# Patient Record
Sex: FEMALE | Race: WHITE | Employment: PART TIME | URBAN - METROPOLITAN AREA
[De-identification: names, ages, dates, MRNs, and addresses within clinical notes are randomized per-mention and may not be internally consistent; named-entity substitution may affect disease eponyms.]

---

## 2017-01-01 ENCOUNTER — HOSPITAL ENCOUNTER (OUTPATIENT)
Dept: OBGYN CLINIC | Age: 27
Discharge: HOME | End: 2017-01-31
Attending: OBSTETRICS & GYNECOLOGY | Admitting: OBSTETRICS & GYNECOLOGY

## 2017-01-06 ENCOUNTER — HOSPITAL ENCOUNTER (OUTPATIENT)
Dept: OBGYN CLINIC | Age: 27
Discharge: HOME | End: 2017-01-06
Admitting: OBSTETRICS & GYNECOLOGY

## 2017-01-06 VITALS
HEART RATE: 118 BPM | WEIGHT: 133 LBS | DIASTOLIC BLOOD PRESSURE: 85 MMHG | BODY MASS INDEX: 22.13 KG/M2 | SYSTOLIC BLOOD PRESSURE: 141 MMHG

## 2017-01-06 DIAGNOSIS — Z34.91 NORMAL PREGNANCY IN FIRST TRIMESTER: Primary | ICD-10-CM

## 2017-01-06 LAB
CLARITY: CLEAR
COLOR: YELLOW
GLUCOSE URINE: NEGATIVE MG/DL
PROTEIN UA: NEGATIVE MG/DL

## 2017-01-06 RX ORDER — VITAMIN A ACETATE, .BETA.-CAROTENE, ASCORBIC ACID, CHOLECALCIFEROL, .ALPHA.-TOCOPHEROL ACETATE, DL-, THIAMINE MONONITRATE, RIBOFLAVIN, NIACINAMIDE, PYRIDOXINE HYDROCHLORIDE, FOLIC ACID, CYANOCOBALAMIN, CALCIUM CARBONATE, FERROUS FUMARATE, ZINC OXIDE, AND CUPRIC OXIDE 2000; 2000; 120; 400; 22; 1.84; 3; 20; 10; 1; 12; 200; 27; 25; 2 [IU]/1; [IU]/1; MG/1; [IU]/1; MG/1; MG/1; MG/1; MG/1; MG/1; MG/1; UG/1; MG/1; MG/1; MG/1; MG/1
1 TABLET ORAL DAILY
Qty: 30 TABLET | Refills: 11 | Status: SHIPPED | OUTPATIENT
Start: 2017-01-06 | End: 2017-02-03

## 2017-01-06 RX ORDER — PNV NO.95/FERROUS FUM/FOLIC AC 28MG-0.8MG
1 TABLET ORAL DAILY
Qty: 30 TABLET | Refills: 11 | Status: SHIPPED | OUTPATIENT
Start: 2017-01-06 | End: 2017-02-03

## 2017-01-11 LAB
AFP INTERPRETATION: NORMAL
AFP MOM: 1.36
AFP SPECIMEN: NORMAL
D-INHIBIN: 155 PG/ML
DATING: NORMAL
EER MATERNAL SCREEN AFP: NORMAL
ESTIMATED DUE DATE: NORMAL
FETUS COUNT: NORMAL
GESTATIONAL AGE: 16.71 WEEKS
HISTORY OF ANEUPLOIDY?: NO
HISTORY/NEURAL TUBE DEFECTS: NO
INSULIN DEP. DIABETIC: NO
MATERNAL AGE AT EDD: 27.2 YR
MATERNAL WEIGHT: 133 LBS
MOM FOR HCG: 1.91
MOM FOR UE3: 0.97
MOM INHIBN: 0.91
PT AFP: 53 NG/ML
PT HCG: NORMAL IU/L
PT UE3: 1.07 NG/ML
RACE: NORMAL

## 2017-02-01 ENCOUNTER — HOSPITAL ENCOUNTER (OUTPATIENT)
Dept: OBGYN CLINIC | Age: 27
Discharge: HOME | End: 2017-02-28
Attending: OBSTETRICS & GYNECOLOGY | Admitting: OBSTETRICS & GYNECOLOGY

## 2017-02-03 ENCOUNTER — HOSPITAL ENCOUNTER (OUTPATIENT)
Dept: OBGYN CLINIC | Age: 27
Discharge: HOME | End: 2017-02-03
Admitting: OBSTETRICS & GYNECOLOGY

## 2017-02-03 ENCOUNTER — HOSPITAL ENCOUNTER (OUTPATIENT)
Dept: ULTRASOUND IMAGING | Age: 27
Discharge: HOME | End: 2017-02-03
Attending: ADVANCED PRACTICE MIDWIFE | Admitting: ADVANCED PRACTICE MIDWIFE

## 2017-02-03 VITALS
WEIGHT: 138 LBS | SYSTOLIC BLOOD PRESSURE: 113 MMHG | DIASTOLIC BLOOD PRESSURE: 79 MMHG | HEART RATE: 90 BPM | BODY MASS INDEX: 22.96 KG/M2

## 2017-02-03 DIAGNOSIS — Z34.91 NORMAL PREGNANCY IN FIRST TRIMESTER: ICD-10-CM

## 2017-02-03 DIAGNOSIS — Z34.00 ENCOUNTER FOR SUPERVISION OF NORMAL FIRST PREGNANCY: ICD-10-CM

## 2017-02-06 LAB
CLARITY: CLEAR
COLOR: YELLOW
GLUCOSE URINE: NEGATIVE MG/DL
PROTEIN UA: NEGATIVE MG/DL

## 2017-03-01 ENCOUNTER — HOSPITAL ENCOUNTER (OUTPATIENT)
Dept: OBGYN CLINIC | Age: 27
Discharge: HOME | End: 2017-03-31
Attending: OBSTETRICS & GYNECOLOGY | Admitting: OBSTETRICS & GYNECOLOGY

## 2017-03-03 ENCOUNTER — HOSPITAL ENCOUNTER (OUTPATIENT)
Dept: OBGYN CLINIC | Age: 27
Discharge: HOME | End: 2017-03-04
Admitting: OBSTETRICS & GYNECOLOGY

## 2017-03-03 VITALS
SYSTOLIC BLOOD PRESSURE: 135 MMHG | HEART RATE: 74 BPM | BODY MASS INDEX: 24.63 KG/M2 | WEIGHT: 148 LBS | DIASTOLIC BLOOD PRESSURE: 84 MMHG

## 2017-03-03 DIAGNOSIS — M41.20 SCOLIOSIS (AND KYPHOSCOLIOSIS), IDIOPATHIC: ICD-10-CM

## 2017-03-03 DIAGNOSIS — O26.892 RH NEGATIVE STATUS DURING PREGNANCY, SECOND TRIMESTER: ICD-10-CM

## 2017-03-03 DIAGNOSIS — Z34.91 NORMAL PREGNANCY IN FIRST TRIMESTER: ICD-10-CM

## 2017-03-03 DIAGNOSIS — Z34.00 ENCOUNTER FOR SUPERVISION OF NORMAL FIRST PREGNANCY: ICD-10-CM

## 2017-03-03 DIAGNOSIS — Z67.91 RH NEGATIVE STATUS DURING PREGNANCY, SECOND TRIMESTER: ICD-10-CM

## 2017-03-03 PROBLEM — O26.899 RH NEGATIVE STATUS DURING PREGNANCY: Status: ACTIVE | Noted: 2017-03-03

## 2017-03-03 LAB
ABO/RH: NORMAL
ANTIBODY SCREEN: NORMAL
GLUCOSE CHALLENGE: 92 MG/DL
HCT VFR BLD CALC: 37.9 % (ref 36–48)
HEMOGLOBIN: 12.3 G/DL (ref 12–16)
MCH RBC QN AUTO: 30.9 PG (ref 26–34)
MCHC RBC AUTO-ENTMCNC: 32.4 G/DL (ref 31–36)
MCV RBC AUTO: 95.2 FL (ref 80–100)
PDW BLD-RTO: 13.5 % (ref 12.4–15.4)
PLATELET # BLD: 311 K/UL (ref 135–450)
PMV BLD AUTO: 8.7 FL (ref 5–10.5)
RBC # BLD: 3.98 M/UL (ref 4–5.2)
WBC # BLD: 11.7 K/UL (ref 4–11)

## 2017-03-04 LAB — RPR: NORMAL

## 2017-03-06 LAB
CLARITY: CLEAR
COLOR: YELLOW
GLUCOSE URINE: NEGATIVE MG/DL
PROTEIN UA: NEGATIVE MG/DL

## 2017-03-31 ENCOUNTER — HOSPITAL ENCOUNTER (OUTPATIENT)
Dept: OBGYN CLINIC | Age: 27
Discharge: HOME | End: 2017-04-01
Admitting: OBSTETRICS & GYNECOLOGY

## 2017-03-31 VITALS
WEIGHT: 157 LBS | BODY MASS INDEX: 26.13 KG/M2 | DIASTOLIC BLOOD PRESSURE: 75 MMHG | HEART RATE: 85 BPM | SYSTOLIC BLOOD PRESSURE: 117 MMHG

## 2017-03-31 DIAGNOSIS — Z67.91 RH NEGATIVE STATUS DURING PREGNANCY, THIRD TRIMESTER: Primary | ICD-10-CM

## 2017-03-31 DIAGNOSIS — O26.893 RH NEGATIVE STATUS DURING PREGNANCY, THIRD TRIMESTER: Primary | ICD-10-CM

## 2017-03-31 DIAGNOSIS — Z34.91 NORMAL PREGNANCY IN FIRST TRIMESTER: ICD-10-CM

## 2017-03-31 LAB
CLARITY: CLEAR
COLOR: YELLOW
GLUCOSE URINE: NEGATIVE MG/DL
PROTEIN UA: NEGATIVE MG/DL
RHIG LOT NUMBER: NORMAL

## 2017-04-14 ENCOUNTER — HOSPITAL ENCOUNTER (OUTPATIENT)
Dept: OBGYN CLINIC | Age: 27
Discharge: HOME | End: 2017-04-15
Admitting: OBSTETRICS & GYNECOLOGY

## 2017-04-14 VITALS
DIASTOLIC BLOOD PRESSURE: 81 MMHG | SYSTOLIC BLOOD PRESSURE: 129 MMHG | BODY MASS INDEX: 26.53 KG/M2 | WEIGHT: 159.4 LBS | HEART RATE: 109 BPM

## 2017-04-14 DIAGNOSIS — Z34.91 NORMAL PREGNANCY IN FIRST TRIMESTER: ICD-10-CM

## 2017-04-14 LAB
CLARITY: CLEAR
COLOR: YELLOW
GLUCOSE URINE: NEGATIVE MG/DL
PROTEIN UA: NEGATIVE MG/DL

## 2017-04-28 ENCOUNTER — HOSPITAL ENCOUNTER (OUTPATIENT)
Dept: OBGYN CLINIC | Age: 27
Discharge: HOME | End: 2017-04-29
Admitting: OBSTETRICS & GYNECOLOGY

## 2017-04-28 VITALS
SYSTOLIC BLOOD PRESSURE: 131 MMHG | DIASTOLIC BLOOD PRESSURE: 88 MMHG | WEIGHT: 164 LBS | HEART RATE: 111 BPM | BODY MASS INDEX: 27.29 KG/M2

## 2017-04-28 LAB
CLARITY: CLEAR
COLOR: YELLOW
GLUCOSE URINE: NEGATIVE MG/DL
PROTEIN UA: NEGATIVE MG/DL

## 2017-04-28 PROCEDURE — 99213 OFFICE O/P EST LOW 20 MIN: CPT | Performed by: OBSTETRICS & GYNECOLOGY

## 2017-05-12 ENCOUNTER — HOSPITAL ENCOUNTER (OUTPATIENT)
Dept: OBGYN CLINIC | Age: 27
Discharge: HOME | End: 2017-05-13
Admitting: OBSTETRICS & GYNECOLOGY

## 2017-05-12 VITALS
BODY MASS INDEX: 28.22 KG/M2 | SYSTOLIC BLOOD PRESSURE: 136 MMHG | WEIGHT: 169.6 LBS | HEART RATE: 92 BPM | DIASTOLIC BLOOD PRESSURE: 84 MMHG

## 2017-05-12 DIAGNOSIS — M41.20 SCOLIOSIS (AND KYPHOSCOLIOSIS), IDIOPATHIC: ICD-10-CM

## 2017-05-12 LAB
CLARITY: CLEAR
COLOR: YELLOW
GLUCOSE URINE: NEGATIVE MG/DL
PROTEIN UA: NEGATIVE MG/DL

## 2017-05-12 PROCEDURE — 99213 OFFICE O/P EST LOW 20 MIN: CPT | Performed by: OBSTETRICS & GYNECOLOGY

## 2017-05-23 ENCOUNTER — HOSPITAL ENCOUNTER (OUTPATIENT)
Dept: OBGYN CLINIC | Age: 27
Discharge: HOME | End: 2017-05-24
Admitting: OBSTETRICS & GYNECOLOGY

## 2017-05-23 VITALS
SYSTOLIC BLOOD PRESSURE: 130 MMHG | BODY MASS INDEX: 28.82 KG/M2 | DIASTOLIC BLOOD PRESSURE: 82 MMHG | HEART RATE: 128 BPM | WEIGHT: 173.2 LBS

## 2017-05-23 DIAGNOSIS — Z34.91 NORMAL PREGNANCY IN FIRST TRIMESTER: ICD-10-CM

## 2017-05-23 LAB
CLARITY: CLEAR
COLOR: YELLOW
GLUCOSE URINE: NEGATIVE MG/DL
PROTEIN UA: ABNORMAL MG/DL

## 2017-05-23 PROCEDURE — 99212 OFFICE O/P EST SF 10 MIN: CPT | Performed by: OBSTETRICS & GYNECOLOGY

## 2017-05-24 LAB
C TRACH DNA GENITAL QL NAA+PROBE: NEGATIVE
N. GONORRHOEAE DNA: NEGATIVE

## 2017-05-25 LAB — GROUP B STREP CULTURE: NORMAL

## 2017-05-30 ENCOUNTER — HOSPITAL ENCOUNTER (OUTPATIENT)
Dept: OBGYN CLINIC | Age: 27
Discharge: HOME | End: 2017-05-31
Admitting: OBSTETRICS & GYNECOLOGY

## 2017-05-30 VITALS
SYSTOLIC BLOOD PRESSURE: 135 MMHG | HEART RATE: 112 BPM | DIASTOLIC BLOOD PRESSURE: 82 MMHG | BODY MASS INDEX: 28.96 KG/M2 | WEIGHT: 174 LBS

## 2017-05-30 LAB
CLARITY: CLEAR
COLOR: YELLOW
GLUCOSE URINE: NEGATIVE MG/DL
PROTEIN UA: NEGATIVE MG/DL

## 2017-05-30 PROCEDURE — 99213 OFFICE O/P EST LOW 20 MIN: CPT | Performed by: OBSTETRICS & GYNECOLOGY

## 2017-06-06 ENCOUNTER — HOSPITAL ENCOUNTER (OUTPATIENT)
Dept: OBGYN CLINIC | Age: 27
Discharge: HOME | End: 2017-06-07
Admitting: OBSTETRICS & GYNECOLOGY

## 2017-06-06 VITALS
HEART RATE: 80 BPM | WEIGHT: 177 LBS | DIASTOLIC BLOOD PRESSURE: 84 MMHG | SYSTOLIC BLOOD PRESSURE: 124 MMHG | BODY MASS INDEX: 29.45 KG/M2

## 2017-06-06 LAB
CLARITY: CLEAR
COLOR: YELLOW
GLUCOSE URINE: NEGATIVE MG/DL
PROTEIN UA: NEGATIVE MG/DL

## 2017-06-06 PROCEDURE — 99212 OFFICE O/P EST SF 10 MIN: CPT | Performed by: OBSTETRICS & GYNECOLOGY

## 2017-06-13 ENCOUNTER — HOSPITAL ENCOUNTER (OUTPATIENT)
Dept: OBGYN CLINIC | Age: 27
Discharge: HOME | End: 2017-06-14
Admitting: OBSTETRICS & GYNECOLOGY

## 2017-06-13 VITALS
HEART RATE: 90 BPM | BODY MASS INDEX: 30.05 KG/M2 | SYSTOLIC BLOOD PRESSURE: 137 MMHG | DIASTOLIC BLOOD PRESSURE: 87 MMHG | WEIGHT: 180.6 LBS

## 2017-06-13 PROCEDURE — 99212 OFFICE O/P EST SF 10 MIN: CPT | Performed by: OBSTETRICS & GYNECOLOGY

## 2017-06-14 LAB
CLARITY: NORMAL
COLOR: YELLOW
GLUCOSE URINE: NEGATIVE MG/DL
PROTEIN UA: NEGATIVE MG/DL

## 2017-06-20 ENCOUNTER — HOSPITAL ENCOUNTER (OUTPATIENT)
Dept: OBGYN CLINIC | Age: 27
Discharge: HOME | End: 2017-06-21
Admitting: OBSTETRICS & GYNECOLOGY

## 2017-06-20 VITALS
SYSTOLIC BLOOD PRESSURE: 125 MMHG | WEIGHT: 183.6 LBS | HEART RATE: 86 BPM | DIASTOLIC BLOOD PRESSURE: 80 MMHG | BODY MASS INDEX: 30.55 KG/M2

## 2017-06-20 DIAGNOSIS — Z34.91 NORMAL PREGNANCY IN FIRST TRIMESTER: Primary | ICD-10-CM

## 2017-06-20 LAB
CLARITY: CLEAR
COLOR: YELLOW
GLUCOSE URINE: NEGATIVE MG/DL
PROTEIN UA: NEGATIVE MG/DL

## 2017-06-20 PROCEDURE — 99213 OFFICE O/P EST LOW 20 MIN: CPT | Performed by: OBSTETRICS & GYNECOLOGY

## 2017-06-22 ENCOUNTER — HOSPITAL ENCOUNTER (OUTPATIENT)
Dept: LABOR AND DELIVERY | Age: 27
Discharge: HOME | End: 2017-07-12
Attending: OBSTETRICS & GYNECOLOGY | Admitting: OBSTETRICS & GYNECOLOGY

## 2017-06-25 ENCOUNTER — HOSPITAL ENCOUNTER (OUTPATIENT)
Dept: LABOR AND DELIVERY | Age: 27
Discharge: HOME | End: 2017-07-15
Attending: OBSTETRICS & GYNECOLOGY | Admitting: OBSTETRICS & GYNECOLOGY

## 2017-08-08 ENCOUNTER — HOSPITAL ENCOUNTER (OUTPATIENT)
Dept: OBGYN CLINIC | Age: 27
Discharge: HOME | End: 2017-08-09
Admitting: OBSTETRICS & GYNECOLOGY

## 2017-08-08 VITALS
BODY MASS INDEX: 25.99 KG/M2 | WEIGHT: 151.4 LBS | DIASTOLIC BLOOD PRESSURE: 81 MMHG | SYSTOLIC BLOOD PRESSURE: 115 MMHG | HEART RATE: 73 BPM

## 2017-08-08 DIAGNOSIS — Z98.891 S/P CESAREAN SECTION: ICD-10-CM

## 2019-03-15 ENCOUNTER — HOSPITAL ENCOUNTER (OUTPATIENT)
Age: 29
Discharge: HOME | End: 2019-03-15
Payer: COMMERCIAL

## 2019-03-15 ENCOUNTER — HOSPITAL ENCOUNTER (OUTPATIENT)
Dept: GENERAL RADIOLOGY | Age: 29
Discharge: HOME | End: 2019-03-15
Payer: COMMERCIAL

## 2019-03-15 ENCOUNTER — HOSPITAL ENCOUNTER (OUTPATIENT)
Dept: GENERAL RADIOLOGY | Age: 29
End: 2019-03-15
Payer: COMMERCIAL

## 2019-03-15 DIAGNOSIS — M54.2 NECK PAIN: ICD-10-CM

## 2019-03-15 PROCEDURE — 72040 X-RAY EXAM NECK SPINE 2-3 VW: CPT

## 2019-03-15 PROCEDURE — 72100 X-RAY EXAM L-S SPINE 2/3 VWS: CPT

## 2019-08-09 ENCOUNTER — HOSPITAL ENCOUNTER (OUTPATIENT)
Dept: OBGYN CLINIC | Age: 29
Discharge: HOME | End: 2019-08-09
Payer: COMMERCIAL

## 2019-08-09 VITALS
SYSTOLIC BLOOD PRESSURE: 113 MMHG | DIASTOLIC BLOOD PRESSURE: 76 MMHG | WEIGHT: 122.6 LBS | BODY MASS INDEX: 21.04 KG/M2 | HEART RATE: 91 BPM

## 2019-08-09 DIAGNOSIS — Z34.90 SECOND PREGNANCY: Primary | ICD-10-CM

## 2019-08-09 DIAGNOSIS — Z86.59 H/O POSTPARTUM DEPRESSION, CURRENTLY PREGNANT: ICD-10-CM

## 2019-08-09 DIAGNOSIS — O26.891 RH NEGATIVE STATUS DURING PREGNANCY IN FIRST TRIMESTER: ICD-10-CM

## 2019-08-09 DIAGNOSIS — O99.891 H/O POSTPARTUM DEPRESSION, CURRENTLY PREGNANT: ICD-10-CM

## 2019-08-09 DIAGNOSIS — Z67.91 RH NEGATIVE STATUS DURING PREGNANCY IN FIRST TRIMESTER: ICD-10-CM

## 2019-08-09 DIAGNOSIS — Z98.891 PREVIOUS CESAREAN SECTION: ICD-10-CM

## 2019-08-09 PROBLEM — F98.8 ADD (ATTENTION DEFICIT DISORDER): Status: ACTIVE | Noted: 2019-08-09

## 2019-08-09 LAB
ABO/RH: NORMAL
AMPHETAMINE SCREEN, URINE: NORMAL
ANTIBODY SCREEN: NORMAL
BARBITURATE SCREEN URINE: NORMAL
BASOPHILS ABSOLUTE: 0 K/UL (ref 0–0.2)
BASOPHILS RELATIVE PERCENT: 0.5 %
BENZODIAZEPINE SCREEN, URINE: NORMAL
BILIRUBIN URINE: NEGATIVE MG/DL
BLOOD, URINE: NEGATIVE
BUPRENORPHINE URINE: NORMAL
C. TRACHOMATIS, EXTERNAL RESULT: NEGATIVE
CANNABINOID SCREEN URINE: NORMAL
CLARITY: CLEAR
COCAINE METABOLITE SCREEN URINE: NORMAL
COLOR: YELLOW
EOSINOPHILS ABSOLUTE: 0.2 K/UL (ref 0–0.6)
EOSINOPHILS RELATIVE PERCENT: 2.5 %
GLUCOSE URINE: NEGATIVE MG/DL
HCT VFR BLD CALC: 39.3 % (ref 36–48)
HEMOGLOBIN: 13.2 G/DL (ref 12–16)
HEP B, EXTERNAL RESULT: NEGATIVE
HEPATITIS B SURFACE ANTIGEN INTERPRETATION: NORMAL
HIV AG/AB: NORMAL
HIV ANTIGEN: NORMAL
HIV, EXTERNAL RESULT: NEGATIVE
HIV-1 ANTIBODY: NORMAL
HIV-2 AB: NORMAL
KETONES, URINE: NEGATIVE MG/DL
LEUKOCYTE ESTERASE, URINE: NEGATIVE
LYMPHOCYTES ABSOLUTE: 2 K/UL (ref 1–5.1)
LYMPHOCYTES RELATIVE PERCENT: 26.9 %
Lab: NORMAL
MCH RBC QN AUTO: 30.5 PG (ref 26–34)
MCHC RBC AUTO-ENTMCNC: 33.7 G/DL (ref 31–36)
MCV RBC AUTO: 90.5 FL (ref 80–100)
METHADONE SCREEN, URINE: NORMAL
MONOCYTES ABSOLUTE: 0.6 K/UL (ref 0–1.3)
MONOCYTES RELATIVE PERCENT: 7.9 %
N. GONORRHOEAE, EXTERNAL RESULT: NEGATIVE
NEUTROPHILS ABSOLUTE: 4.6 K/UL (ref 1.7–7.7)
NEUTROPHILS RELATIVE PERCENT: 62.2 %
NITRITE, URINE: NEGATIVE
OPIATE SCREEN URINE: NORMAL
OXYCODONE URINE: NORMAL
PDW BLD-RTO: 12.8 % (ref 12.4–15.4)
PH UA: 5
PH UA: 6 (ref 5–8)
PHENCYCLIDINE SCREEN URINE: NORMAL
PLATELET # BLD: 310 K/UL (ref 135–450)
PMV BLD AUTO: 7.6 FL (ref 5–10.5)
PROPOXYPHENE SCREEN: NORMAL
PROTEIN UA: NEGATIVE MG/DL
RBC # BLD: 4.34 M/UL (ref 4–5.2)
RPR, EXTERNAL RESULT: NON REACTIVE
RUBELLA ANTIBODY IGG: 115.5 IU/ML
RUBELLA TITER, EXTERNAL RESULT: NORMAL
SPECIFIC GRAVITY UA: 1.01 (ref 1–1.03)
TOTAL SYPHILLIS IGG/IGM: NORMAL
UROBILINOGEN, URINE: 0.2 E.U./DL
WBC # BLD: 7.4 K/UL (ref 4–11)

## 2019-08-09 PROCEDURE — 87086 URINE CULTURE/COLONY COUNT: CPT

## 2019-08-09 PROCEDURE — 36415 COLL VENOUS BLD VENIPUNCTURE: CPT

## 2019-08-09 PROCEDURE — 85025 COMPLETE CBC W/AUTO DIFF WBC: CPT

## 2019-08-09 PROCEDURE — 87591 N.GONORRHOEAE DNA AMP PROB: CPT

## 2019-08-09 PROCEDURE — 99213 OFFICE O/P EST LOW 20 MIN: CPT

## 2019-08-09 PROCEDURE — 86850 RBC ANTIBODY SCREEN: CPT

## 2019-08-09 PROCEDURE — 87491 CHLMYD TRACH DNA AMP PROBE: CPT

## 2019-08-09 PROCEDURE — 87340 HEPATITIS B SURFACE AG IA: CPT

## 2019-08-09 PROCEDURE — 86762 RUBELLA ANTIBODY: CPT

## 2019-08-09 PROCEDURE — 86901 BLOOD TYPING SEROLOGIC RH(D): CPT

## 2019-08-09 PROCEDURE — 86702 HIV-2 ANTIBODY: CPT

## 2019-08-09 PROCEDURE — 80307 DRUG TEST PRSMV CHEM ANLYZR: CPT

## 2019-08-09 PROCEDURE — 86701 HIV-1ANTIBODY: CPT

## 2019-08-09 PROCEDURE — 86900 BLOOD TYPING SEROLOGIC ABO: CPT

## 2019-08-09 PROCEDURE — 87390 HIV-1 AG IA: CPT

## 2019-08-09 PROCEDURE — 81003 URINALYSIS AUTO W/O SCOPE: CPT

## 2019-08-09 PROCEDURE — 86780 TREPONEMA PALLIDUM: CPT

## 2019-08-09 PROCEDURE — 99203 OFFICE O/P NEW LOW 30 MIN: CPT | Performed by: OBSTETRICS & GYNECOLOGY

## 2019-08-09 PROCEDURE — 88175 CYTOPATH C/V AUTO FLUID REDO: CPT

## 2019-08-09 RX ORDER — LANOLIN ALCOHOL/MO/W.PET/CERES
25 CREAM (GRAM) TOPICAL 3 TIMES DAILY PRN
Qty: 30 TABLET | Refills: 3 | Status: SHIPPED | OUTPATIENT
Start: 2019-08-09 | End: 2019-11-27 | Stop reason: ALTCHOICE

## 2019-08-09 NOTE — PLAN OF CARE
Intermediate The teaching will focus on visit schedule and laboratory tests, plus additional topics such as health and lifestyle (e.g. kick counts, diet). This education can be found in the Discharge Instructions []   2   Complex New patient information packet given to the patient/caregiver and reviewed with the Registered Nurse. [x]   3       Patient Discharge and Planning  Planning Definition Points   General Follow-up with routine assessment and planning. Discharge instructions and After Visit Summary given to patient/caregiver and reviewed with the Registered Nurse. Simple follow-up with routine assessment and planning.    []   1   Intermediate Follow-up with routine assessment and planning. Discharge instructions and After Visit Summary given to patient/caregiver and reviewed with the Registered Nurse. Contact with additional resources (e.g. , Physician, Lactation). May include filling out forms, writing letters, communication with insurance , FLMA forms, etc.   [x]   2   Complex Full, comprehensive assessment and planning which includes assistance for a hospital admission or transfer to a higher level of care facility.   []   3     Is this the Patient's First Visit to the Prenatal Clinic    Yes     Is this Patient Established @ this [facility] AT [place]  Yes             Clinical Level of Care      Points  0-3  Level 1 []     Points  4-6  Level 2 []     Points  7-8  Level 3 [x]     Points  9-10  Level 4 []     Points  11-12  Level 5 []       Electronically signed by Ghada Mcmillan RN on 8/9/2019 at 9:44 AM

## 2019-08-09 NOTE — PROGRESS NOTES
Pt here to establish prenatal care. Intake completed per Nurse Navigator. History/meds/genetic screening questions completed. Pt pharmacy entered. Needs RX for prenatal vitamin. Denies any current complaints. New patient folder reviewed, education r/t POC, Medications/discomforts in pregnancy, when to call clinic, after hours info, delivery at [facility], etc completed at this time. Pt reports h/o anxiety/depression with management via Lexapro & Adderall prior to pregnancy discovery. D/C'd use at that time. Genetic screening ( includes patient, baby's father, or anyone in either family )   Type yes or no after each. Patient's age >32 @ EDC:  no  Thalassemia ([place], Mediterranean or  background) MCV < 80:       no   Neural Tube Defect:  no  Congenital Heart Defect:  no  Down Syndrome:  no  Tom-sachs (Ashkenazi Roman Catholic, [place], [place]):  no  Canavan Disease ([place] Herring):  no  Familial Dysautonomia ([place] Herring):  no  Sickle Cell Disease or trait (): no  Hemophilia:  no  Muscular Dystrophy:  no  Cystic Fibrosis:  no  Abby's Chorea:  no  Mental Retardation/Autism:  no  Other inherited genetic or chromosomal disorder: Patient brother born with [address], . Maternal metabolic disorder (EG. Type 1 diabetes, PKU):  no  Pt or baby's father had a child with birth defects not listed above:  no  Recurrent pregnancy loss or stillbirth:  no  Medications (vitamins, herbs, OTC drugs, illicit/recreational drugs/alcohol) since LMP  List: Lexapro/Adderall prior to pregnancy discovery.      Infection History    Live with someone with TB or exposed to TB: no  Pt or partner with history of genital herpes: no  Rash or viral illness since LMP:  no  History of MRSA:  no  Hepatitis B or C:  no  Multiple tattoos or history of IV drug use: no  History of STD (gonorrhea, chlamydia, HPV, HIV, Syphilis):no    HIGH RISK GDM (If patient meets one criteria do early 1 hour GCT)    Previous medical history

## 2019-08-09 NOTE — PROGRESS NOTES
Prenatal [facility]  [address], [address] [facility], [facility] 24      OB Follow-Up Visit Progress Note    Chief Complaint   Patient presents with    Initial Prenatal Visit        Subjective:     HISTORY OF PRESENT ILLNESS (HPI)    History of  Jerri Otoole is a 34 y.o.,  at 9w1d who presents to the clinic for her follow-up OB visit. Dara Martinez  complains of nausea. She does not complain of vaginal bleeding. She does not report leakage of fluid. She  does not complain of contractions. She  does not complain of decreased fetal movement. PAST MEDICAL HISTORY    No past medical history on file. PAST SURGICAL HISTORY    Past Surgical History:   Procedure Laterality Date    ABDOMEN SURGERY          BACK SURGERY      screws put in bilat hip    BACK SURGERY      rods placed in her back        FAMILY HISTORY    Family History   Problem Relation Age of Onset    No Known Problems Father     High Blood Pressure Mother     Hypothyroidism Mother     Other Mother     No Known Problems Brother     No Known Problems Sister        SOCIAL HISTORY    Social History     Tobacco Use    Smoking status: Never Smoker    Smokeless tobacco: Never Used   Substance Use Topics    Alcohol use: No    Drug use: No       ALLERGIES    No Known Allergies    MEDICATIONS    Current Outpatient Medications on File Prior to Encounter   Medication Sig Dispense Refill    Prenatal Vit-Fe Fumarate-FA (PRENATAL PO) Take 1 tablet by mouth daily       No current facility-administered medications on file prior to encounter. REVIEW OF SYSTEMS    Pertinent items are noted in HPI.     Objective:      /76   Pulse 91   Wt 122 lb 9.6 oz (55.6 kg)   LMP 2019 (Approximate)   BMI 21.04 kg/m²     Wt Readings from Last 2 Encounters:   19 122 lb 9.6 oz (55.6 kg)   17 151 lb 6.4 oz (68.7 kg)       See ACOG flowsheet above    Physical Exam:  A

## 2019-08-10 LAB — URINE CULTURE, ROUTINE: NORMAL

## 2019-08-12 LAB
C TRACH DNA GENITAL QL NAA+PROBE: NEGATIVE
N. GONORRHOEAE DNA: NEGATIVE

## 2019-09-06 ENCOUNTER — HOSPITAL ENCOUNTER (OUTPATIENT)
Dept: OBGYN CLINIC | Age: 29
End: 2019-09-06
Payer: COMMERCIAL

## 2019-09-06 ENCOUNTER — HOSPITAL ENCOUNTER (OUTPATIENT)
Dept: OBGYN CLINIC | Age: 29
Discharge: HOME | End: 2019-09-06
Payer: COMMERCIAL

## 2019-09-06 VITALS
BODY MASS INDEX: 21.7 KG/M2 | WEIGHT: 126.4 LBS | HEART RATE: 81 BPM | SYSTOLIC BLOOD PRESSURE: 121 MMHG | DIASTOLIC BLOOD PRESSURE: 78 MMHG

## 2019-09-06 DIAGNOSIS — Z34.90 SECOND PREGNANCY: ICD-10-CM

## 2019-09-06 LAB
BILIRUBIN URINE: NEGATIVE MG/DL
BLOOD, URINE: NEGATIVE
CLARITY: CLEAR
COLOR: YELLOW
GLUCOSE URINE: NEGATIVE MG/DL
KETONES, URINE: NEGATIVE MG/DL
LEUKOCYTE ESTERASE, URINE: NEGATIVE
NITRITE, URINE: NEGATIVE
PH UA: 6 (ref 5–8)
PROTEIN UA: NEGATIVE MG/DL
SPECIFIC GRAVITY UA: 1.02 (ref 1–1.03)
UROBILINOGEN, URINE: 0.2 E.U./DL

## 2019-09-06 PROCEDURE — 81003 URINALYSIS AUTO W/O SCOPE: CPT

## 2019-09-06 PROCEDURE — 99213 OFFICE O/P EST LOW 20 MIN: CPT | Performed by: OBSTETRICS & GYNECOLOGY

## 2019-09-06 PROCEDURE — 99211 OFF/OP EST MAY X REQ PHY/QHP: CPT

## 2019-09-06 NOTE — PROGRESS NOTES
Pt here for prenatal visit. C/o tiredness and had a dizzy spell on Tuesday and had to stay home from work. Not currently dizzy. C/o nausea, but no vomiting.
Prenatal Vit-Fe Fumarate-FA (PRENATAL PO) Take 1 tablet by mouth daily   Yes Historical Provider, MD   vitamin B-6 (PYRIDOXINE) 50 MG tablet Take 0.5 tablets by mouth 3 times daily as needed (nausea, vomiting) 19   Onesimo Pagan MD   doxyLAMINE succinate (GNP SLEEP AID) 25 MG tablet Take 1 tablet by mouth nightly as needed for Sleep (nausea, vomiting) 19   Onesimo Pagan MD       REVIEW OF SYSTEMS    Pertinent items are noted in HPI. Objective:      /78   Pulse 81   Wt 126 lb 6.4 oz (57.3 kg)   LMP 2019 (Approximate)   BMI 21.70 kg/m²     Wt Readings from Last 2 Encounters:   19 126 lb 6.4 oz (57.3 kg)   19 122 lb 9.6 oz (55.6 kg)       See ACOG flowsheet above    Physical Exam:  A physical exam was not performed during this visit. Neurologic/Psychiatric: alert and oriented X3  Constitutional: alert and oriented to person, place and time, well-developed and well-nourished, in no acute distress  Gastrointestinal: Abdomen  is soft and  is non tender and gravid  Uterus:  Non tender. Pelvic Exam:  A pelvic exam was not completed at this visit. Assessment:     Patient Active Problem List   Diagnosis Code    Scoliosis (and kyphoscoliosis), idiopathic M41.20    Rh negative status during pregnancy O26.899, Z67.91    Previous  section Z98.891    34yo  Z34.90    ADD (attention deficit disorder) F98.8    H/O postpartum depression, currently pregnant O80.80, Z80.58       34 y.o. [id number] 13w1d    Obstetrician: Rica Berumen MD      Plan:     - Prenatal labs: Reviewed by the physician  - declines aneuploidy screening  - reviewed Rhogam for Rh neg  - discussed RLTCS, timing, scheduling  - Patient to follow up in: 4 weeks    Kick count N/A. Pregnancy expectations were discussed and all questions were answered. 75% of time was spent discussing findings, management, and treatment options.             Electronically signed by Rica Berumen MD

## 2019-09-06 NOTE — PLAN OF CARE
Prenatal Clinic  Clinical Level of [address]    NAME: Jennifer Bermeo  YOB: 1990 GENDER: female  MEDICAL RECORD NUMBER:  [id number]  DATE:  9/6/2019    Assessment   Points   No Assessment []   0   General Prenatal assessment (e.g. weight, vital signs, urine test). Completed OB Clinic navigator tabs, in partnership with the Registered Nurse. [x]   1   General Prenatal assessment (e.g. weight, vital signs, urine test). Completed OB Clinic navigator tabs, in partnership with the Registered Nurse. Set up tests or procedures (e.g. ultrasound, specimen swabs, induction). []   2   Full Prenatal assessment (e.g. weight, vitals signs, urine test) to include a full review of history. Completed OB Clinic navigator tabs, in partnership with the Registered Nurse. Set up tests or procedures (e.g. ultrasound, specimen swabs, induction). Or transfer to an outside facility, transfer to [facility] for further evaluation, admission to the hospital.  []   3       Ambulation Status   Status Definition Points   Independent Independently able to ambulate. Fully able (without any assistance) to get on/off exam table/chair. [x]   0   Minimal Physical Assistance Requires physical assistance of one person to ambulate and/or position patient to be examined. Includes necessary physical assistance to position lower extremities on/off stool. []   1   Moderate Physical Assistance Requires at least one staff member to physically assist patient in ambulating into treatment room, and on/off recliner chair. []   2   Full Assistance Requires assistance of at least two staff members to transfer patient into treatment room and/or on/off exam table/chair. \"Total Transfer\". []   3       Teaching Effort   Effort Definition Points   No Teaching  []   0   General The teaching will focus on visit schedule and laboratory tests. This education can be found in the Discharge Instructions.  [x]   1 Intermediate The teaching will focus on visit schedule and laboratory tests, plus additional topics such as health and lifestyle (e.g. kick counts, diet). This education can be found in the Discharge Instructions []   2   Complex New patient information packet given to the patient/caregiver and reviewed with the Registered Nurse.   []   3       Patient Discharge and Planning  Planning Definition Points   General Follow-up with routine assessment and planning. Discharge instructions and After Visit Summary given to patient/caregiver and reviewed with the Registered Nurse. Simple follow-up with routine assessment and planning. [x]   1   Intermediate Follow-up with routine assessment and planning. Discharge instructions and After Visit Summary given to patient/caregiver and reviewed with the Registered Nurse. Contact with additional resources (e.g. , Physician, Lactation). May include filling out forms, writing letters, communication with insurance , FLMA forms, etc.   []   2   Complex Full, comprehensive assessment and planning which includes assistance for a hospital admission or transfer to a higher level of care facility.   []   3     Is this the Patient's First Visit to the Prenatal Clinic    No     Is this Patient Established @ this [facility] AT [place]  Yes             Clinical Level of Care      Points  0-3  Level 1 [x]     Points  4-6  Level 2 []     Points  7-8  Level 3 []     Points  9-10  Level 4 []     Points  11-12  Level 5 []       Electronically signed by Ever Ruiz RN on 9/6/2019 at 9:19 AM

## 2019-10-04 ENCOUNTER — HOSPITAL ENCOUNTER (OUTPATIENT)
Dept: OBGYN CLINIC | Age: 29
Discharge: HOME | End: 2019-10-04
Payer: COMMERCIAL

## 2019-10-04 ENCOUNTER — HOSPITAL ENCOUNTER (OUTPATIENT)
Dept: OBGYN CLINIC | Age: 29
End: 2019-10-04
Payer: COMMERCIAL

## 2019-10-04 VITALS
DIASTOLIC BLOOD PRESSURE: 70 MMHG | WEIGHT: 131.6 LBS | HEART RATE: 81 BPM | SYSTOLIC BLOOD PRESSURE: 109 MMHG | BODY MASS INDEX: 22.59 KG/M2

## 2019-10-04 DIAGNOSIS — Z98.891 PREVIOUS CESAREAN SECTION: ICD-10-CM

## 2019-10-04 DIAGNOSIS — Z34.90 SECOND PREGNANCY: Primary | ICD-10-CM

## 2019-10-04 LAB
BILIRUBIN URINE: NEGATIVE MG/DL
BLOOD, URINE: NEGATIVE
CLARITY: CLEAR
COLOR: YELLOW
GLUCOSE URINE: NEGATIVE MG/DL
KETONES, URINE: NEGATIVE MG/DL
LEUKOCYTE ESTERASE, URINE: NEGATIVE
NITRITE, URINE: NEGATIVE
PH UA: 5.5 (ref 5–8)
PROTEIN UA: NEGATIVE MG/DL
SPECIFIC GRAVITY UA: 1.01 (ref 1–1.03)
UROBILINOGEN, URINE: 0.2 E.U./DL

## 2019-10-04 PROCEDURE — 99212 OFFICE O/P EST SF 10 MIN: CPT | Performed by: OBSTETRICS & GYNECOLOGY

## 2019-10-04 PROCEDURE — 99211 OFF/OP EST MAY X REQ PHY/QHP: CPT

## 2019-10-04 PROCEDURE — 81003 URINALYSIS AUTO W/O SCOPE: CPT

## 2019-11-01 ENCOUNTER — HOSPITAL ENCOUNTER (OUTPATIENT)
Dept: ULTRASOUND IMAGING | Age: 29
Discharge: HOME | End: 2019-11-01
Payer: COMMERCIAL

## 2019-11-01 ENCOUNTER — HOSPITAL ENCOUNTER (OUTPATIENT)
Dept: OBGYN CLINIC | Age: 29
Discharge: HOME | End: 2019-11-01
Payer: COMMERCIAL

## 2019-11-01 VITALS
HEART RATE: 99 BPM | BODY MASS INDEX: 23.09 KG/M2 | DIASTOLIC BLOOD PRESSURE: 69 MMHG | WEIGHT: 134.5 LBS | SYSTOLIC BLOOD PRESSURE: 101 MMHG

## 2019-11-01 DIAGNOSIS — Z34.90 SECOND PREGNANCY: ICD-10-CM

## 2019-11-01 DIAGNOSIS — Z98.891 PREVIOUS CESAREAN SECTION: Primary | ICD-10-CM

## 2019-11-01 LAB
BILIRUBIN URINE: NEGATIVE MG/DL
BLOOD, URINE: NEGATIVE
CLARITY: CLEAR
COLOR: YELLOW
GLUCOSE URINE: NEGATIVE MG/DL
KETONES, URINE: NEGATIVE MG/DL
LEUKOCYTE ESTERASE, URINE: NEGATIVE
NITRITE, URINE: NEGATIVE
PH UA: 5.5 (ref 5–8)
PROTEIN UA: NEGATIVE MG/DL
SPECIFIC GRAVITY UA: 1.02 (ref 1–1.03)
UROBILINOGEN, URINE: 0.2 E.U./DL

## 2019-11-01 PROCEDURE — 99212 OFFICE O/P EST SF 10 MIN: CPT | Performed by: OBSTETRICS & GYNECOLOGY

## 2019-11-01 PROCEDURE — 81003 URINALYSIS AUTO W/O SCOPE: CPT

## 2019-11-01 PROCEDURE — 99212 OFFICE O/P EST SF 10 MIN: CPT

## 2019-11-01 PROCEDURE — 76805 OB US >/= 14 WKS SNGL FETUS: CPT

## 2019-11-27 ENCOUNTER — HOSPITAL ENCOUNTER (OUTPATIENT)
Dept: OBGYN CLINIC | Age: 29
Discharge: HOME | End: 2019-11-27
Payer: COMMERCIAL

## 2019-11-27 VITALS
WEIGHT: 144.4 LBS | SYSTOLIC BLOOD PRESSURE: 115 MMHG | HEART RATE: 79 BPM | BODY MASS INDEX: 24.79 KG/M2 | DIASTOLIC BLOOD PRESSURE: 83 MMHG

## 2019-11-27 DIAGNOSIS — Z34.90 SECOND PREGNANCY: Primary | ICD-10-CM

## 2019-11-27 DIAGNOSIS — Z98.891 PREVIOUS CESAREAN SECTION: ICD-10-CM

## 2019-11-27 LAB
ABO/RH: NORMAL
ANTIBODY SCREEN: NORMAL
BILIRUBIN URINE: NEGATIVE MG/DL
BLOOD, URINE: NEGATIVE
CLARITY: CLEAR
COLOR: YELLOW
GLUCOSE CHALLENGE: 116 MG/DL
GLUCOSE URINE: NEGATIVE MG/DL
HCT VFR BLD CALC: 36.1 % (ref 36–48)
HEMOGLOBIN: 12.3 G/DL (ref 12–16)
KETONES, URINE: NEGATIVE MG/DL
LEUKOCYTE ESTERASE, URINE: NEGATIVE
MCH RBC QN AUTO: 32 PG (ref 26–34)
MCHC RBC AUTO-ENTMCNC: 34.2 G/DL (ref 31–36)
MCV RBC AUTO: 93.4 FL (ref 80–100)
NITRITE, URINE: NEGATIVE
PDW BLD-RTO: 13.5 % (ref 12.4–15.4)
PH UA: 6.5 (ref 5–8)
PLATELET # BLD: 243 K/UL (ref 135–450)
PMV BLD AUTO: 8.4 FL (ref 5–10.5)
PROTEIN UA: NEGATIVE MG/DL
RBC # BLD: 3.86 M/UL (ref 4–5.2)
SPECIFIC GRAVITY UA: 1.02 (ref 1–1.03)
TOTAL SYPHILLIS IGG/IGM: NORMAL
UROBILINOGEN, URINE: 0.2 E.U./DL
WBC # BLD: 9 K/UL (ref 4–11)

## 2019-11-27 PROCEDURE — 99212 OFFICE O/P EST SF 10 MIN: CPT

## 2019-11-27 PROCEDURE — 90686 IIV4 VACC NO PRSV 0.5 ML IM: CPT | Performed by: OBSTETRICS & GYNECOLOGY

## 2019-11-27 PROCEDURE — G0008 ADMIN INFLUENZA VIRUS VAC: HCPCS | Performed by: OBSTETRICS & GYNECOLOGY

## 2019-11-27 PROCEDURE — 85027 COMPLETE CBC AUTOMATED: CPT

## 2019-11-27 PROCEDURE — 99213 OFFICE O/P EST LOW 20 MIN: CPT | Performed by: OBSTETRICS & GYNECOLOGY

## 2019-11-27 PROCEDURE — 86850 RBC ANTIBODY SCREEN: CPT

## 2019-11-27 PROCEDURE — 36415 COLL VENOUS BLD VENIPUNCTURE: CPT

## 2019-11-27 PROCEDURE — 6360000002 HC RX W HCPCS: Performed by: OBSTETRICS & GYNECOLOGY

## 2019-11-27 PROCEDURE — 86900 BLOOD TYPING SEROLOGIC ABO: CPT

## 2019-11-27 PROCEDURE — 86780 TREPONEMA PALLIDUM: CPT

## 2019-11-27 PROCEDURE — 86901 BLOOD TYPING SEROLOGIC RH(D): CPT

## 2019-11-27 PROCEDURE — 82950 GLUCOSE TEST: CPT

## 2019-11-27 PROCEDURE — 81003 URINALYSIS AUTO W/O SCOPE: CPT

## 2019-11-27 RX ADMIN — INFLUENZA A VIRUS A/BRISBANE/02/2018 IVR-190 (H1N1) ANTIGEN (PROPIOLACTONE INACTIVATED), INFLUENZA A VIRUS A/KANSAS/14/2017 X-327 (H3N2) ANTIGEN (PROPIOLACTONE INACTIVATED), INFLUENZA B VIRUS B/MARYLAND/15/2016 ANTIGEN (PROPIOLACTONE INACTIVATED), INFLUENZA B VIRUS B/PHUKET/3073/2013 BVR-1B ANTIGEN (PROPIOLACTONE INACTIVATED) 0.5 ML: 15; 15; 15; 15 INJECTION, SUSPENSION INTRAMUSCULAR at 09:05

## 2019-12-20 DIAGNOSIS — O26.899 RH NEGATIVE STATE IN ANTEPARTUM PERIOD: Primary | ICD-10-CM

## 2019-12-20 DIAGNOSIS — Z67.91 RH NEGATIVE STATE IN ANTEPARTUM PERIOD: Primary | ICD-10-CM

## 2019-12-27 ENCOUNTER — HOSPITAL ENCOUNTER (OUTPATIENT)
Dept: INFUSION THERAPY | Age: 29
Setting detail: INFUSION SERIES
Discharge: HOME | End: 2019-12-27
Payer: COMMERCIAL

## 2019-12-27 ENCOUNTER — HOSPITAL ENCOUNTER (OUTPATIENT)
Dept: OBGYN CLINIC | Age: 29
Discharge: HOME | End: 2019-12-27
Payer: COMMERCIAL

## 2019-12-27 VITALS
WEIGHT: 150 LBS | SYSTOLIC BLOOD PRESSURE: 123 MMHG | DIASTOLIC BLOOD PRESSURE: 78 MMHG | HEART RATE: 87 BPM | BODY MASS INDEX: 25.75 KG/M2

## 2019-12-27 VITALS
OXYGEN SATURATION: 98 % | TEMPERATURE: 98.1 F | DIASTOLIC BLOOD PRESSURE: 76 MMHG | SYSTOLIC BLOOD PRESSURE: 117 MMHG | RESPIRATION RATE: 18 BRPM | HEART RATE: 76 BPM

## 2019-12-27 LAB
BILIRUBIN URINE: NEGATIVE MG/DL
BLOOD, URINE: NEGATIVE
CLARITY: NORMAL
COLOR: YELLOW
GLUCOSE URINE: NEGATIVE MG/DL
KETONES, URINE: NEGATIVE MG/DL
LEUKOCYTE ESTERASE, URINE: NEGATIVE
NITRITE, URINE: NEGATIVE
PH UA: 7 (ref 5–8)
PROTEIN UA: NEGATIVE MG/DL
RHIG LOT NUMBER: NORMAL
SPECIFIC GRAVITY UA: 1.01 (ref 1–1.03)
UROBILINOGEN, URINE: 0.2 E.U./DL

## 2019-12-27 PROCEDURE — 81003 URINALYSIS AUTO W/O SCOPE: CPT

## 2019-12-27 PROCEDURE — 99211 OFF/OP EST MAY X REQ PHY/QHP: CPT

## 2019-12-27 PROCEDURE — 6360000002 HC RX W HCPCS: Performed by: OBSTETRICS & GYNECOLOGY

## 2019-12-27 PROCEDURE — 99212 OFFICE O/P EST SF 10 MIN: CPT | Performed by: OBSTETRICS & GYNECOLOGY

## 2019-12-27 PROCEDURE — 96372 THER/PROPH/DIAG INJ SC/IM: CPT

## 2019-12-27 RX ADMIN — HUMAN RHO(D) IMMUNE GLOBULIN 300 MCG: 300 INJECTION, SOLUTION INTRAMUSCULAR at 11:30

## 2019-12-27 NOTE — PROGRESS NOTES
Outpatient [id number] [facility]    Rhogam Injection Visit    NAME:  Marcos Doty  YOB: 1990  MEDICAL RECORD NUMBER:  [id number]  DATE:  2019    Patient [facility] to [facility]   [] per wheelchair   [x] ambulatory    Patient just saw Dr. Orelia Goldberg in [facility] clinic. Needs Rhogam injection today     Patient Active Problem List   Diagnosis    Scoliosis (and kyphoscoliosis), idiopathic    Rh negative status during pregnancy    Previous  section    32yo     ADD (attention deficit disorder)    H/O postpartum depression, currently pregnant       Does patient have prior history of allergy to human immune globulin:  Does patient have history of  IgA deficiency:      ( # of pregnancies ) 2    Parity ( # of births )  1    Gestational age:  34 Weeks    Patient is Rh negative:  Yes    [de-identified] Rh type is:    [] Positive   [x] Unknown     Indication for Rhogam use:    [x] Antepartum prophylaxis, 26 - 28 week prophylaxis  [] Transfusion of incompatable blood    [] Other    Blood type and Antibody screening done:  Yes     Pulse 76   Temp 98.1 °F (36.7 °C) (Oral)   LMP 2019 (Approximate)   SpO2 98%       Pulse Oximetry: 98 %    Patient given instructions of possible adverse reactions:  [x] Written   [x] Oral  Locally:injection site swelling, induration, redness and mild pain at injection site, and possibly low grade temperature. Hypersensitivity reactions:  Hives, itching, chest tightness, wheezing, hypotension, wheezing and anaphylaxsis    Rho(D) Immune Globulin 300 ug  administered IM in LEFT DELTOID  Lot No: [id number]        Expiration date: 2021    Response to treatment:  Well tolerated by patient. Patient to follow up with [facility].       Electronically signed by Melly Lamb RN on 2019 at 11:39 AM.

## 2020-02-07 LAB — GBS, EXTERNAL RESULT: NEGATIVE

## 2020-02-25 ENCOUNTER — ANESTHESIA EVENT (OUTPATIENT)
Dept: LABOR AND DELIVERY | Age: 30
DRG: 540 | End: 2020-02-25
Payer: COMMERCIAL

## 2020-02-25 ENCOUNTER — HOSPITAL ENCOUNTER (INPATIENT)
Age: 30
LOS: 2 days | Discharge: HOME | DRG: 540 | End: 2020-02-27
Attending: OBSTETRICS & GYNECOLOGY | Admitting: OBSTETRICS & GYNECOLOGY
Payer: COMMERCIAL

## 2020-02-25 ENCOUNTER — ANESTHESIA (OUTPATIENT)
Dept: LABOR AND DELIVERY | Age: 30
DRG: 540 | End: 2020-02-25
Payer: COMMERCIAL

## 2020-02-25 VITALS — SYSTOLIC BLOOD PRESSURE: 111 MMHG | OXYGEN SATURATION: 100 % | DIASTOLIC BLOOD PRESSURE: 58 MMHG

## 2020-02-25 PROBLEM — Z98.891 HISTORY OF C-SECTION: Status: ACTIVE | Noted: 2020-02-25

## 2020-02-25 LAB
ABO/RH: NORMAL
AMPHETAMINE SCREEN, URINE: NORMAL
ANTIBODY IDENTIFICATION: NORMAL
ANTIBODY SCREEN: NORMAL
BARBITURATE SCREEN URINE: NORMAL
BENZODIAZEPINE SCREEN, URINE: NORMAL
BUPRENORPHINE URINE: NORMAL
CANNABINOID SCREEN URINE: NORMAL
COCAINE METABOLITE SCREEN URINE: NORMAL
DAT IGG CAPTURE: NORMAL
HCT VFR BLD CALC: 38.1 % (ref 36–48)
HEMOGLOBIN: 12.7 G/DL (ref 12–16)
Lab: NORMAL
MCH RBC QN AUTO: 30.7 PG (ref 26–34)
MCHC RBC AUTO-ENTMCNC: 33.4 G/DL (ref 31–36)
MCV RBC AUTO: 91.9 FL (ref 80–100)
METHADONE SCREEN, URINE: NORMAL
OPIATE SCREEN URINE: NORMAL
OXYCODONE URINE: NORMAL
PDW BLD-RTO: 13.1 % (ref 12.4–15.4)
PH UA: 6
PHENCYCLIDINE SCREEN URINE: NORMAL
PLATELET # BLD: 247 K/UL (ref 135–450)
PMV BLD AUTO: 8.9 FL (ref 5–10.5)
PROPOXYPHENE SCREEN: NORMAL
RBC # BLD: 4.15 M/UL (ref 4–5.2)
TOTAL SYPHILLIS IGG/IGM: NORMAL
WBC # BLD: 10.8 K/UL (ref 4–11)

## 2020-02-25 PROCEDURE — 7100000000 HC PACU RECOVERY - FIRST 15 MIN: Performed by: OBSTETRICS & GYNECOLOGY

## 2020-02-25 PROCEDURE — 80307 DRUG TEST PRSMV CHEM ANLYZR: CPT

## 2020-02-25 PROCEDURE — 86900 BLOOD TYPING SEROLOGIC ABO: CPT

## 2020-02-25 PROCEDURE — 3609079900 HC CESAREAN SECTION: Performed by: OBSTETRICS & GYNECOLOGY

## 2020-02-25 PROCEDURE — 2500000003 HC RX 250 WO HCPCS: Performed by: OBSTETRICS & GYNECOLOGY

## 2020-02-25 PROCEDURE — 86780 TREPONEMA PALLIDUM: CPT

## 2020-02-25 PROCEDURE — 6360000002 HC RX W HCPCS: Performed by: NURSE ANESTHETIST, CERTIFIED REGISTERED

## 2020-02-25 PROCEDURE — 6360000002 HC RX W HCPCS: Performed by: OBSTETRICS & GYNECOLOGY

## 2020-02-25 PROCEDURE — 86850 RBC ANTIBODY SCREEN: CPT

## 2020-02-25 PROCEDURE — 6360000002 HC RX W HCPCS: Performed by: ANESTHESIOLOGY

## 2020-02-25 PROCEDURE — 85027 COMPLETE CBC AUTOMATED: CPT

## 2020-02-25 PROCEDURE — 7100000001 HC PACU RECOVERY - ADDTL 15 MIN: Performed by: OBSTETRICS & GYNECOLOGY

## 2020-02-25 PROCEDURE — 86922 COMPATIBILITY TEST ANTIGLOB: CPT

## 2020-02-25 PROCEDURE — 86880 COOMBS TEST DIRECT: CPT

## 2020-02-25 PROCEDURE — 86870 RBC ANTIBODY IDENTIFICATION: CPT

## 2020-02-25 PROCEDURE — 6370000000 HC RX 637 (ALT 250 FOR IP): Performed by: OBSTETRICS & GYNECOLOGY

## 2020-02-25 PROCEDURE — 2709999900 HC NON-CHARGEABLE SUPPLY: Performed by: OBSTETRICS & GYNECOLOGY

## 2020-02-25 PROCEDURE — 3700000001 HC ADD 15 MINUTES (ANESTHESIA): Performed by: OBSTETRICS & GYNECOLOGY

## 2020-02-25 PROCEDURE — 2580000003 HC RX 258: Performed by: OBSTETRICS & GYNECOLOGY

## 2020-02-25 PROCEDURE — 86901 BLOOD TYPING SEROLOGIC RH(D): CPT

## 2020-02-25 PROCEDURE — 2580000003 HC RX 258: Performed by: NURSE ANESTHETIST, CERTIFIED REGISTERED

## 2020-02-25 PROCEDURE — 3700000000 HC ANESTHESIA ATTENDED CARE: Performed by: OBSTETRICS & GYNECOLOGY

## 2020-02-25 PROCEDURE — 2500000003 HC RX 250 WO HCPCS: Performed by: NURSE ANESTHETIST, CERTIFIED REGISTERED

## 2020-02-25 PROCEDURE — 1220000000 HC SEMI PRIVATE OB R&B

## 2020-02-25 RX ORDER — SIMETHICONE 80 MG
80 TABLET,CHEWABLE ORAL EVERY 6 HOURS PRN
Status: DISCONTINUED | OUTPATIENT
Start: 2020-02-25 | End: 2020-02-27 | Stop reason: HOSPADM

## 2020-02-25 RX ORDER — IBUPROFEN 400 MG/1
800 TABLET ORAL EVERY 8 HOURS PRN
Status: DISCONTINUED | OUTPATIENT
Start: 2020-02-26 | End: 2020-02-27 | Stop reason: HOSPADM

## 2020-02-25 RX ORDER — FENTANYL CITRATE 50 UG/ML
INJECTION, SOLUTION INTRAMUSCULAR; INTRAVENOUS PRN
Status: DISCONTINUED | OUTPATIENT
Start: 2020-02-25 | End: 2020-02-25 | Stop reason: SDUPTHER

## 2020-02-25 RX ORDER — MORPHINE SULFATE 1 MG/ML
INJECTION, SOLUTION EPIDURAL; INTRATHECAL; INTRAVENOUS PRN
Status: DISCONTINUED | OUTPATIENT
Start: 2020-02-25 | End: 2020-02-25

## 2020-02-25 RX ORDER — ONDANSETRON 2 MG/ML
4 INJECTION INTRAMUSCULAR; INTRAVENOUS EVERY 6 HOURS PRN
Status: DISCONTINUED | OUTPATIENT
Start: 2020-02-25 | End: 2020-02-27 | Stop reason: HOSPADM

## 2020-02-25 RX ORDER — KETOROLAC TROMETHAMINE 30 MG/ML
30 INJECTION, SOLUTION INTRAMUSCULAR; INTRAVENOUS EVERY 6 HOURS
Status: COMPLETED | OUTPATIENT
Start: 2020-02-25 | End: 2020-02-26

## 2020-02-25 RX ORDER — DIPHENHYDRAMINE HYDROCHLORIDE 50 MG/ML
25 INJECTION INTRAMUSCULAR; INTRAVENOUS EVERY 6 HOURS PRN
Status: DISCONTINUED | OUTPATIENT
Start: 2020-02-25 | End: 2020-02-27 | Stop reason: HOSPADM

## 2020-02-25 RX ORDER — DOCUSATE SODIUM 100 MG/1
100 CAPSULE, LIQUID FILLED ORAL 2 TIMES DAILY
Status: DISCONTINUED | OUTPATIENT
Start: 2020-02-25 | End: 2020-02-27 | Stop reason: HOSPADM

## 2020-02-25 RX ORDER — SODIUM CHLORIDE 0.9 % (FLUSH) 0.9 %
10 SYRINGE (ML) INJECTION EVERY 12 HOURS SCHEDULED
Status: DISCONTINUED | OUTPATIENT
Start: 2020-02-25 | End: 2020-02-27 | Stop reason: HOSPADM

## 2020-02-25 RX ORDER — NALBUPHINE HCL 10 MG/ML
5 AMPUL (ML) INJECTION EVERY 4 HOURS PRN
Status: DISCONTINUED | OUTPATIENT
Start: 2020-02-25 | End: 2020-02-27 | Stop reason: HOSPADM

## 2020-02-25 RX ORDER — KETOROLAC TROMETHAMINE 30 MG/ML
INJECTION, SOLUTION INTRAMUSCULAR; INTRAVENOUS PRN
Status: DISCONTINUED | OUTPATIENT
Start: 2020-02-25 | End: 2020-02-25 | Stop reason: SDUPTHER

## 2020-02-25 RX ORDER — ACETAMINOPHEN 650 MG/1
650 SUPPOSITORY RECTAL EVERY 4 HOURS PRN
Status: ACTIVE | OUTPATIENT
Start: 2020-02-25 | End: 2020-02-26

## 2020-02-25 RX ORDER — OXYCODONE HYDROCHLORIDE AND ACETAMINOPHEN 5; 325 MG/1; MG/1
2 TABLET ORAL EVERY 4 HOURS PRN
Status: DISCONTINUED | OUTPATIENT
Start: 2020-02-25 | End: 2020-02-27 | Stop reason: HOSPADM

## 2020-02-25 RX ORDER — SODIUM CHLORIDE, SODIUM LACTATE, POTASSIUM CHLORIDE, CALCIUM CHLORIDE 600; 310; 30; 20 MG/100ML; MG/100ML; MG/100ML; MG/100ML
INJECTION, SOLUTION INTRAVENOUS CONTINUOUS
Status: DISCONTINUED | OUTPATIENT
Start: 2020-02-25 | End: 2020-02-27 | Stop reason: HOSPADM

## 2020-02-25 RX ORDER — OXYTOCIN 10 [USP'U]/ML
INJECTION, SOLUTION INTRAMUSCULAR; INTRAVENOUS PRN
Status: DISCONTINUED | OUTPATIENT
Start: 2020-02-25 | End: 2020-02-25 | Stop reason: SDUPTHER

## 2020-02-25 RX ORDER — LANOLIN 100 %
OINTMENT (GRAM) TOPICAL
Status: DISCONTINUED | OUTPATIENT
Start: 2020-02-25 | End: 2020-02-27 | Stop reason: HOSPADM

## 2020-02-25 RX ORDER — METOCLOPRAMIDE HYDROCHLORIDE 5 MG/ML
10 INJECTION INTRAMUSCULAR; INTRAVENOUS ONCE
Status: COMPLETED | OUTPATIENT
Start: 2020-02-25 | End: 2020-02-25

## 2020-02-25 RX ORDER — SODIUM CHLORIDE 0.9 % (FLUSH) 0.9 %
10 SYRINGE (ML) INJECTION PRN
Status: DISCONTINUED | OUTPATIENT
Start: 2020-02-25 | End: 2020-02-27 | Stop reason: HOSPADM

## 2020-02-25 RX ORDER — FENTANYL CITRATE 50 UG/ML
INJECTION, SOLUTION INTRAMUSCULAR; INTRAVENOUS PRN
Status: DISCONTINUED | OUTPATIENT
Start: 2020-02-25 | End: 2020-02-25

## 2020-02-25 RX ORDER — OXYCODONE HYDROCHLORIDE AND ACETAMINOPHEN 5; 325 MG/1; MG/1
1 TABLET ORAL EVERY 4 HOURS PRN
Status: DISCONTINUED | OUTPATIENT
Start: 2020-02-25 | End: 2020-02-27 | Stop reason: HOSPADM

## 2020-02-25 RX ORDER — METHYLERGONOVINE MALEATE 0.2 MG/ML
200 INJECTION INTRAVENOUS PRN
Status: DISCONTINUED | OUTPATIENT
Start: 2020-02-25 | End: 2020-02-27 | Stop reason: HOSPADM

## 2020-02-25 RX ORDER — ONDANSETRON 2 MG/ML
INJECTION INTRAMUSCULAR; INTRAVENOUS PRN
Status: DISCONTINUED | OUTPATIENT
Start: 2020-02-25 | End: 2020-02-25 | Stop reason: SDUPTHER

## 2020-02-25 RX ORDER — NALOXONE HYDROCHLORIDE 0.4 MG/ML
0.4 INJECTION, SOLUTION INTRAMUSCULAR; INTRAVENOUS; SUBCUTANEOUS PRN
Status: DISCONTINUED | OUTPATIENT
Start: 2020-02-25 | End: 2020-02-27 | Stop reason: HOSPADM

## 2020-02-25 RX ORDER — SODIUM CHLORIDE, SODIUM LACTATE, POTASSIUM CHLORIDE, CALCIUM CHLORIDE 600; 310; 30; 20 MG/100ML; MG/100ML; MG/100ML; MG/100ML
INJECTION, SOLUTION INTRAVENOUS CONTINUOUS PRN
Status: DISCONTINUED | OUTPATIENT
Start: 2020-02-25 | End: 2020-02-25 | Stop reason: SDUPTHER

## 2020-02-25 RX ORDER — EPHEDRINE SULFATE/0.9% NACL/PF 50 MG/5 ML
SYRINGE (ML) INTRAVENOUS PRN
Status: DISCONTINUED | OUTPATIENT
Start: 2020-02-25 | End: 2020-02-25 | Stop reason: SDUPTHER

## 2020-02-25 RX ORDER — PRENATAL VIT/IRON FUM/FOLIC AC 27MG-0.8MG
1 TABLET ORAL DAILY
Status: DISCONTINUED | OUTPATIENT
Start: 2020-02-26 | End: 2020-02-27 | Stop reason: HOSPADM

## 2020-02-25 RX ORDER — SODIUM CHLORIDE, SODIUM LACTATE, POTASSIUM CHLORIDE, AND CALCIUM CHLORIDE .6; .31; .03; .02 G/100ML; G/100ML; G/100ML; G/100ML
1000 INJECTION, SOLUTION INTRAVENOUS ONCE
Status: COMPLETED | OUTPATIENT
Start: 2020-02-25 | End: 2020-02-25

## 2020-02-25 RX ORDER — MORPHINE SULFATE 1 MG/ML
INJECTION, SOLUTION EPIDURAL; INTRATHECAL; INTRAVENOUS PRN
Status: DISCONTINUED | OUTPATIENT
Start: 2020-02-25 | End: 2020-02-25 | Stop reason: SDUPTHER

## 2020-02-25 RX ORDER — NICOTINE 21 MG/24HR
1 PATCH, TRANSDERMAL 24 HOURS TRANSDERMAL DAILY
Status: DISCONTINUED | OUTPATIENT
Start: 2020-02-25 | End: 2020-02-27 | Stop reason: HOSPADM

## 2020-02-25 RX ADMIN — ONDANSETRON 4 MG: 2 INJECTION INTRAMUSCULAR; INTRAVENOUS at 09:31

## 2020-02-25 RX ADMIN — Medication 10 MG: at 09:52

## 2020-02-25 RX ADMIN — KETOROLAC TROMETHAMINE 30 MG: 30 INJECTION, SOLUTION INTRAMUSCULAR at 10:45

## 2020-02-25 RX ADMIN — OXYTOCIN 20 UNITS: 10 INJECTION INTRAVENOUS at 10:05

## 2020-02-25 RX ADMIN — FENTANYL CITRATE 12.5 MCG: 50 INJECTION INTRAMUSCULAR; INTRAVENOUS at 09:42

## 2020-02-25 RX ADMIN — SODIUM CHLORIDE, POTASSIUM CHLORIDE, SODIUM LACTATE AND CALCIUM CHLORIDE: 600; 310; 30; 20 INJECTION, SOLUTION INTRAVENOUS at 20:12

## 2020-02-25 RX ADMIN — CEFAZOLIN 2 G: 10 INJECTION, POWDER, FOR SOLUTION INTRAVENOUS at 09:31

## 2020-02-25 RX ADMIN — KETOROLAC TROMETHAMINE 30 MG: 30 INJECTION, SOLUTION INTRAMUSCULAR at 23:44

## 2020-02-25 RX ADMIN — MORPHINE SULFATE 0.2 MG: 1 INJECTION, SOLUTION EPIDURAL; INTRATHECAL; INTRAVENOUS at 09:42

## 2020-02-25 RX ADMIN — METOCLOPRAMIDE HYDROCHLORIDE 10 MG: 5 INJECTION INTRAMUSCULAR; INTRAVENOUS at 09:08

## 2020-02-25 RX ADMIN — Medication 50 ML/HR: at 10:27

## 2020-02-25 RX ADMIN — Medication 10 MG: at 10:51

## 2020-02-25 RX ADMIN — DOCUSATE SODIUM 100 MG: 100 CAPSULE, LIQUID FILLED ORAL at 23:43

## 2020-02-25 RX ADMIN — SODIUM CHLORIDE, SODIUM LACTATE, POTASSIUM CHLORIDE, AND CALCIUM CHLORIDE: .6; .31; .03; .02 INJECTION, SOLUTION INTRAVENOUS at 09:31

## 2020-02-25 RX ADMIN — SODIUM CHLORIDE, POTASSIUM CHLORIDE, SODIUM LACTATE AND CALCIUM CHLORIDE 1000 ML: 600; 310; 30; 20 INJECTION, SOLUTION INTRAVENOUS at 07:55

## 2020-02-25 RX ADMIN — SODIUM CHLORIDE, POTASSIUM CHLORIDE, SODIUM LACTATE AND CALCIUM CHLORIDE: 600; 310; 30; 20 INJECTION, SOLUTION INTRAVENOUS at 08:45

## 2020-02-25 RX ADMIN — SODIUM CHLORIDE, SODIUM LACTATE, POTASSIUM CHLORIDE, AND CALCIUM CHLORIDE: .6; .31; .03; .02 INJECTION, SOLUTION INTRAVENOUS at 09:58

## 2020-02-25 RX ADMIN — FAMOTIDINE 20 MG: 10 INJECTION INTRAVENOUS at 09:08

## 2020-02-25 RX ADMIN — Medication 10 MG: at 09:59

## 2020-02-25 RX ADMIN — KETOROLAC TROMETHAMINE 30 MG: 30 INJECTION, SOLUTION INTRAMUSCULAR at 16:38

## 2020-02-25 RX ADMIN — ENOXAPARIN SODIUM 40 MG: 40 INJECTION SUBCUTANEOUS at 23:43

## 2020-02-25 RX ADMIN — SODIUM CHLORIDE, SODIUM LACTATE, POTASSIUM CHLORIDE, AND CALCIUM CHLORIDE: .6; .31; .03; .02 INJECTION, SOLUTION INTRAVENOUS at 10:29

## 2020-02-25 RX ADMIN — Medication 10 MG: at 09:46

## 2020-02-25 RX ADMIN — Medication 10 MG: at 09:50

## 2020-02-25 ASSESSMENT — PULMONARY FUNCTION TESTS
PIF_VALUE: 0
PIF_VALUE: 1
PIF_VALUE: 0
PIF_VALUE: 1
PIF_VALUE: 0
PIF_VALUE: 2
PIF_VALUE: 0

## 2020-02-25 ASSESSMENT — PAIN SCALES - GENERAL
PAINLEVEL_OUTOF10: 0
PAINLEVEL_OUTOF10: 0
PAINLEVEL_OUTOF10: 1

## 2020-02-25 NOTE — ANESTHESIA PROCEDURE NOTES
Spinal Block    Patient location during procedure: OB  Start time: 2/25/2020 9:31 AM  End time: 2/25/2020 9:41 AM  Reason for block: primary anesthetic  Staffing  Anesthesiologist: Jose Alejandro Fermin MD  Resident/CRNA: ALAYNA Ferro CRNA  Performed: resident/CRNA   Preanesthetic Checklist  Completed: patient identified, site marked, surgical consent, pre-op evaluation, timeout performed, IV checked, risks and benefits discussed, monitors and equipment checked, anesthesia consent given, oxygen available and patient being monitored  Spinal Block  Patient position: sitting  Prep: Betadine and site prepped and draped  Patient monitoring: continuous pulse ox and frequent blood pressure checks  Approach: midline  Location: L3/L4  Procedures: paresthesia technique  Provider prep: mask and sterile gloves  Local infiltration: lidocaine  Agent: bupivacaine  Dose: 0.6  Dose: 0.6  Needle  Needle type:  Alfredito   Needle gauge: 27 G  Needle length: 3.5 in  Needle insertion depth: 4 cm  Assessment  Sensory level: T4  Swirl obtained: Yes  CSF: clear  Attempts: 1  Hemodynamics: stable  Additional Notes  Duramorph 0.20 & Fentanyl 12.5 mcg via SAB

## 2020-02-25 NOTE — PLAN OF CARE
Problem: Anxiety:  Goal: Level of anxiety will decrease  Description  Level of anxiety will decrease  2/25/2020 1820 by Kendra Davis RN  Outcome: Met This Shift  2/25/2020 1819 by Kendra Davis RN  Outcome: Met This Shift     Problem: Aspiration - Risk of:  Goal: Absence of aspiration  Description  Absence of aspiration  2/25/2020 1820 by Kendra Davis RN  Outcome: Met This Shift  2/25/2020 1819 by Kendra Davis RN  Outcome: Met This Shift     Problem: Tissue Perfusion - Uteroplacental, Altered:  Goal: Absence of abnormal fetal heart rate pattern  Description  Absence of abnormal fetal heart rate pattern  2/25/2020 1820 by Kendra Davis RN  Outcome: Met This Shift  2/25/2020 1819 by Kendra Davis RN  Outcome: Met This Shift     Problem: Venous Thromboembolism - Risk of:  Goal: Will show no signs or symptoms of venous thromboembolism  Description  Will show no signs or symptoms of venous thromboembolism  2/25/2020 1820 by Kendra Davis RN  Outcome: Met This Shift  2/25/2020 1819 by Kendra Davis RN  Outcome: Met This Shift     Problem: Pain:  Goal: Pain level will decrease  Description  Pain level will decrease  2/25/2020 1820 by Kendra Davis RN  Outcome: Met This Shift  2/25/2020 1819 by Kendra Davis RN  Outcome: Met This Shift  Goal: Control of acute pain  Description  Control of acute pain  2/25/2020 1820 by Kendra Davis RN  Outcome: Met This Shift  2/25/2020 1819 by Kendra Davis RN  Outcome: Met This Shift  Goal: Control of chronic pain  Description  Control of chronic pain  2/25/2020 1820 by Kendra Davis RN  Outcome: Met This Shift  2/25/2020 1819 by Kendra Davis RN  Outcome: Met This Shift     Problem: Fluid Volume - Imbalance:  Goal: Absence of postpartum hemorrhage signs and symptoms  Description  Absence of postpartum hemorrhage signs and symptoms  Outcome: Met This Shift  Goal: Absence of imbalanced fluid volume signs and symptoms  Description  Absence of imbalanced fluid volume signs and symptoms  Outcome: Met This Shift     Problem: Infection - Surgical Site:  Goal: Will show no infection signs and symptoms  Description  Will show no infection signs and symptoms  Outcome: Met This Shift     Problem: Mood - Altered:  Goal: Mood stable  Description  Mood stable  Outcome: Met This Shift     Problem: Nausea/Vomiting:  Goal: Absence of nausea/vomiting  Description  Absence of nausea/vomiting  Outcome: Met This Shift     Problem: Pain - Acute:  Goal: Pain level will decrease  Description  Pain level will decrease  2/25/2020 1820 by Kris Rosa RN  Outcome: Met This Shift  2/25/2020 1819 by Krsi Rosa RN  Outcome: Met This Shift     Problem: Urinary Retention:  Goal: Urinary elimination within specified parameters  Description  Urinary elimination within specified parameters  Outcome: Met This Shift     Problem: Venous Thromboembolism:  Goal: Will show no signs or symptoms of venous thromboembolism  Description  Will show no signs or symptoms of venous thromboembolism  2/25/2020 1820 by Kris Rosa RN  Outcome: Met This Shift  2/25/2020 1819 by Kris Rosa RN  Outcome: Met This Shift  Goal: Absence of signs or symptoms of impaired coagulation  Description  Absence of signs or symptoms of impaired coagulation  Outcome: Met This Shift

## 2020-02-25 NOTE — ANESTHESIA POSTPROCEDURE EVALUATION
Department of Anesthesiology  Postprocedure Note    Patient: Pradeep Thomason  MRN: [id number]  YOB: 1990  Date of evaluation: 2020  Time:  11:00 AM     Procedure Summary     Date:  20 Room / Location:  [facility] OR [address] [place] [address]    Anesthesia Start:  1103 Anesthesia Stop:  1059    Procedure:  repeat  SECTION with low transverse uterine incision at 0956 (N/A ) Diagnosis:  (Repeat )    Surgeon:  José Miguel Johnson MD Responsible Provider:  Tony Schuster MD    Anesthesia Type:  spinal ASA Status:  2          Anesthesia Type: spinal    Dotty Phase I:      Dotty Phase II:      Last vitals: Reviewed and per EMR flowsheets.        Anesthesia Post Evaluation    Patient location during evaluation: PACU  Patient participation: complete - patient participated  Level of consciousness: awake and alert  Pain score: 0  Airway patency: patent  Nausea & Vomiting: no vomiting and no nausea  Complications: no  Cardiovascular status: hemodynamically stable  Respiratory status: spontaneous ventilation and room air  Hydration status: stable  Comments: /75   Pulse 95   Temp 36.4 °C (97.5 °F) (Temporal)   Resp 18   Ht 5' 4\" (1.626 m)   Wt 165 lb (74.8 kg)   LMP 2019 (Approximate)   BMI 28.32 kg/m²

## 2020-02-25 NOTE — FLOWSHEET NOTE
End of recovery, estrellita care done and patient transferred to room 2255 with infant. Oriented to room, call light and binder. Clears given to patient to eat and drink. No other needs at this time expressed by the patient. Left patient in room with baby and Marley Rand.

## 2020-02-25 NOTE — L&D DELIVERY NOTE
rectus muscle was divided in the midline sharply. The peritoneum was entered bluntly with hemostat and extended inferiorly and superiorly with Metzenbaum scissors. The Marlo retractor was then inserted. The vesicouterine peritoneum was identified, grasped with pick-ups and entered sharply with Metzenbaum scissors. The bladder flap was then created digitally. A low transverse uterine incision was made with the scalpel and extended laterally with blunt finger dissection. The baby was delivered atraumatically in cephalic presentation. The nose and mouth were suctioned. The cord was clamped and cut and the baby was handed off to the waiting nursing staff. Placenta was then delivered spontaneously. The uterus was not  exteriorized and it was cleared of all clots and debris. The uterine incision was closed in two layers. The first layer with running locked layer of 0 Monocryl. The second layer was an imbricating layer of 0 Monocryl with good hemostasis assured. The paracolic gutters were cleaned with moistened laps, removing blood clots and debris. Good hemostasis was again reassured throughout. The Marlo retractor was then removed from the abdomen. The peritoneum was closed with 3-0 Vicryl in a running fashion after first and second lap and instrument count were correct. The rectus muscles were approximated with 4 interrupted matress sutures of O-Vycril to prevent future diastasis. The fascia was closed with 0 Vicryl in a running fashion. Good hemostasis was assured. The subcuticular layers were irrigated with warm normal saline and bleeding controlled with Bovie cautery. The subcuticular layer was reapproximated with 3-0 Vicryl in interrupted fashion. The skin was closed with a 4-0 Vicryl in a subcuticular fashion. Derma bond was applied to skin. The patient tolerated the procedure well.  Sponge, lap, and needle counts were correct times three and the patient was taken to recovery in stable

## 2020-02-25 NOTE — H&P
Department of Obstetrics and Gynecology   Obstetrics History and Physical        CHIEF COMPLAINT:  Repeat CS     HISTORY OF PRESENT ILLNESS:    Pradeep Thomason  is a 34 y.o. [place] Tomlinson female at 44 3/7 presents with a chief complaint as above and is being admitted for   without tubal ligation. Estimated Due Date: Estimated Date of Delivery: 3/12/20    PRENATAL CARE: Complicated by: 1. H/o hip fusion and multiple back surgeries     PAST OB HISTORY:  OB History        2    Para   1    Term   1            AB        Living   1       SAB        TAB        Ectopic        Molar        Multiple        Live Births   1              Past Medical History:        Diagnosis Date    ADD (attention deficit disorder) 2019     Past Surgical History:        Procedure Laterality Date    ABDOMEN SURGERY          BACK SURGERY  2007    screws put in bilat hip    BACK SURGERY  2008    rods placed in her back      Allergies:  Patient has no known allergies.   Social History:    Social History     Socioeconomic History    Marital status: Single     Spouse name: Not on file    Number of children: 6    Years of education: Not on file    Highest education level: Not on file   Occupational History    Occupation: Medical assistant   Social Needs    Financial resource strain: Not on file    Food insecurity:     Worry: Not on file     Inability: Not on file    Transportation needs:     Medical: Not on file     Non-medical: Not on file   Tobacco Use    Smoking status: Never Smoker    Smokeless tobacco: Never Used   Substance and Sexual Activity    Alcohol use: No    Drug use: No    Sexual activity: Yes     Partners: Male   Lifestyle    Physical activity:     Days per week: Not on file     Minutes per session: Not on file    Stress: Not on file   Relationships    Social connections:     Talks on phone: Not on file     Gets together: Not on file     Attends Sabianist service: Not on file     Active

## 2020-02-25 NOTE — ANESTHESIA PRE PROCEDURE
Department of Anesthesiology  Preprocedure Note       Name:  Norah Boland   Age:  34 y.o.  :  1990                                          MRN:  [id number]         Date:  2020      Surgeon: Kassandra Gonzalez):  Lynne Schaffer MD    Procedure:  SECTION (N/A )    Medications prior to admission:   Prior to Admission medications    Medication Sig Start Date End Date Taking?  Authorizing Provider   Prenatal Vit-Fe Fumarate-FA (PRENATAL PO) Take 1 tablet by mouth daily   Yes Historical Provider, MD       Current medications:    Current Facility-Administered Medications   Medication Dose Route Frequency Provider Last Rate Last Dose    lactated ringers infusion   Intravenous Continuous Lynne Schaffer MD        lactated ringers bolus  1,000 mL Intravenous Once Lynne Schaffer MD        sodium chloride flush 0.9 % injection 10 mL  10 mL Intravenous 2 times per day Lynne Schaffer MD        sodium chloride flush 0.9 % injection 10 mL  10 mL Intravenous PRN Lynne Schaffer MD        oxytocin (PITOCIN) 30 units in 500 mL infusion  1 zoie-units/min Intravenous Continuous Lynne Schaffer MD        ceFAZolin (ANCEF) 2 g in dextrose 5 % 100 mL IVPB  2 g Intravenous Once Lynne Schaffer MD        famotidine (PEPCID) injection 20 mg  20 mg Intravenous Once Lynne Schaffer MD        metoclopramide (REGLAN) injection 10 mg  10 mg Intravenous Once Lynne Schaffer MD           Allergies:  No Known Allergies    Problem List:    Patient Active Problem List   Diagnosis Code    Scoliosis (and kyphoscoliosis), idiopathic M41.20    Rh negative status during pregnancy O26.899, Z67.91    Previous  section Z98.891    34yo  Z34.90    ADD (attention deficit disorder) F80.6    H/O postpartum depression, currently pregnant O99.89, Z86.59       Past Medical History:        Diagnosis Date    ADD (attention deficit disorder) 2019       Past Surgical History:        Procedure

## 2020-02-26 LAB
HCT VFR BLD CALC: 34.7 % (ref 36–48)
HEMOGLOBIN: 11.6 G/DL (ref 12–16)
MCH RBC QN AUTO: 31.2 PG (ref 26–34)
MCHC RBC AUTO-ENTMCNC: 33.4 G/DL (ref 31–36)
MCV RBC AUTO: 93.5 FL (ref 80–100)
PDW BLD-RTO: 13.4 % (ref 12.4–15.4)
PLATELET # BLD: 232 K/UL (ref 135–450)
PMV BLD AUTO: 9 FL (ref 5–10.5)
RBC # BLD: 3.72 M/UL (ref 4–5.2)
WBC # BLD: 10.6 K/UL (ref 4–11)

## 2020-02-26 PROCEDURE — 1220000000 HC SEMI PRIVATE OB R&B

## 2020-02-26 PROCEDURE — 6360000002 HC RX W HCPCS: Performed by: ANESTHESIOLOGY

## 2020-02-26 PROCEDURE — 36415 COLL VENOUS BLD VENIPUNCTURE: CPT

## 2020-02-26 PROCEDURE — 6370000000 HC RX 637 (ALT 250 FOR IP): Performed by: OBSTETRICS & GYNECOLOGY

## 2020-02-26 PROCEDURE — 85027 COMPLETE CBC AUTOMATED: CPT

## 2020-02-26 PROCEDURE — 6360000002 HC RX W HCPCS: Performed by: OBSTETRICS & GYNECOLOGY

## 2020-02-26 RX ADMIN — OXYCODONE HYDROCHLORIDE AND ACETAMINOPHEN 1 TABLET: 5; 325 TABLET ORAL at 16:00

## 2020-02-26 RX ADMIN — IBUPROFEN 800 MG: 400 TABLET ORAL at 21:15

## 2020-02-26 RX ADMIN — ENOXAPARIN SODIUM 40 MG: 40 INJECTION SUBCUTANEOUS at 23:47

## 2020-02-26 RX ADMIN — PRENATAL VIT W/ FE FUMARATE-FA TAB 27-0.8 MG 1 TABLET: 27-0.8 TAB at 08:22

## 2020-02-26 RX ADMIN — KETOROLAC TROMETHAMINE 30 MG: 30 INJECTION, SOLUTION INTRAMUSCULAR at 05:22

## 2020-02-26 RX ADMIN — DOCUSATE SODIUM 100 MG: 100 CAPSULE, LIQUID FILLED ORAL at 08:22

## 2020-02-26 RX ADMIN — IBUPROFEN 800 MG: 400 TABLET ORAL at 10:24

## 2020-02-26 RX ADMIN — DOCUSATE SODIUM 100 MG: 100 CAPSULE, LIQUID FILLED ORAL at 21:15

## 2020-02-26 ASSESSMENT — PAIN SCALES - GENERAL
PAINLEVEL_OUTOF10: 2
PAINLEVEL_OUTOF10: 0
PAINLEVEL_OUTOF10: 4
PAINLEVEL_OUTOF10: 3

## 2020-02-26 ASSESSMENT — PAIN DESCRIPTION - DESCRIPTORS
DESCRIPTORS: CRAMPING

## 2020-02-26 NOTE — ANESTHESIA POSTPROCEDURE EVALUATION
Department of Anesthesiology  Postprocedure Note    Patient: Gloria Gonzalez  MRN: [id number]  YOB: 1990  Date of evaluation: 2020  Time:  10:18 AM     Procedure Summary     Date:  20 Room / Location:  [facility] [address]    Anesthesia Start:  1216 Anesthesia Stop:  1059    Procedure:  repeat  SECTION with low transverse uterine incision at 0956 (N/A ) Diagnosis:  (Repeat )    Surgeon:  Suraj Goldman MD Responsible Provider:  Sharyle Marek, MD    Anesthesia Type:  spinal ASA Status:  2          Anesthesia Type: spinal    Dotty Phase I:      Dotty Phase II:      Last vitals: Reviewed and per EMR flowsheets. Anesthesia Post Evaluation    Patient location during evaluation: bedside  Patient participation: complete - patient participated  Level of consciousness: awake and alert  Airway patency: patent  Nausea & Vomiting: no nausea and no vomiting  Complications: no  Cardiovascular status: hemodynamically stable  Respiratory status: room air  Hydration status: stable  Comments: [facility] Department of Anesthesiology  Post-Anesthesia Note       Name:  Gloria Gonzalez                                         Age:  34 y.o. MRN:  [id number]   39w3d      Gloria Gonzalez was evaluated on postpartum day 1. She expresses no concerns regarding her anesthesia care at this time.         Last Vitals & Oxygen Saturation: /87   Pulse 82   Temp 36.7 °C (98.1 °F) (Oral)   Resp 18   Ht 5' 4\" (1.626 m)   Wt 165 lb (74.8 kg)   LMP 2019 (Approximate)   SpO2 99%   Breastfeeding Unknown   BMI 28.32 kg/m²       Anesthesia: spinal    Level of consciousness: awake, alert and oriented    Respiratory: stable     Cardiovascular: stable     Hydration: stable     PONV:  none    Pruritis: none    Post Dural Puncture Headache: denies    Post-op pain: adequate analgesia    Out of bed and ambulating without difficulty: Yes    Post-op assessment: no apparent

## 2020-02-26 NOTE — PLAN OF CARE
Problem: Venous Thromboembolism - Risk of:  Goal: Will show no signs or symptoms of venous thromboembolism  Description  Will show no signs or symptoms of venous thromboembolism  2/26/2020 0516 by Patricia Corley RN  Outcome: Ongoing  2/25/2020 1820 by Rosa Maria Valdes RN  Outcome: Met This Shift  2/25/2020 1819 by Rosa Maria Valdes RN  Outcome: Met This Shift     Problem: Pain:  Goal: Pain level will decrease  Description  Pain level will decrease  2/26/2020 0516 by Patricia Corley RN  Outcome: Ongoing  2/25/2020 1820 by Rosa Maria Valdes RN  Outcome: Met This Shift  2/25/2020 1819 by Rosa Maria Valdes RN  Outcome: Met This Shift  Goal: Control of acute pain  Description  Control of acute pain  2/25/2020 1820 by Rosa Maria Valdes RN  Outcome: Met This Shift  2/25/2020 1819 by Rosa Maria Valdes RN  Outcome: Met This Shift  Goal: Control of chronic pain  Description  Control of chronic pain  2/25/2020 1820 by Rosa Maria Valdes RN  Outcome: Met This Shift  2/25/2020 1819 by Rosa Maria Valdes RN  Outcome: Met This Shift     Problem: Fluid Volume - Imbalance:  Goal: Absence of postpartum hemorrhage signs and symptoms  Description  Absence of postpartum hemorrhage signs and symptoms  2/26/2020 0516 by Patricia Corley RN  Outcome: Ongoing  2/25/2020 1820 by Rosa Maria Valdes RN  Outcome: Met This Shift  Goal: Absence of imbalanced fluid volume signs and symptoms  Description  Absence of imbalanced fluid volume signs and symptoms  2/26/2020 0516 by Patricia Corley RN  Outcome: Ongoing  2/25/2020 1820 by Rosa Maria Valdes RN  Outcome: Met This Shift     Problem: Fluid Volume - Imbalance:  Goal: Absence of imbalanced fluid volume signs and symptoms  Description  Absence of imbalanced fluid volume signs and symptoms  2/26/2020 0516 by Patricia Corley RN  Outcome: Ongoing  2/25/2020 1820 by Rosa Maria Valdes RN  Outcome: Met This Shift     Problem: Infection - Surgical Site:  Goal: Will show no infection signs and symptoms  Description  Will show no infection signs and symptoms  2/26/2020 0516 by Catalina Bob RN  Outcome: Ongoing  2/25/2020 1820 by Lei Chase RN  Outcome: Met This Shift     Problem: Mood - Altered:  Goal: Mood stable  Description  Mood stable  2/25/2020 1820 by Lei Chase RN  Outcome: Met This Shift     Problem: Nausea/Vomiting:  Goal: Absence of nausea/vomiting  Description  Absence of nausea/vomiting  2/25/2020 1820 by Lei Chase RN  Outcome: Met This Shift     Problem: Pain - Acute:  Goal: Pain level will decrease  Description  Pain level will decrease  2/26/2020 0516 by Catalina Bob RN  Outcome: Ongoing  2/25/2020 1820 by Lei Chase RN  Outcome: Met This Shift  2/25/2020 1819 by Lei Chase RN  Outcome: Met This Shift     Problem: Urinary Retention:  Goal: Urinary elimination within specified parameters  Description  Urinary elimination within specified parameters  2/26/2020 0516 by Catalina Bob RN  Outcome: Ongoing  2/25/2020 1820 by Lei Chase RN  Outcome: Met This Shift     Problem: Venous Thromboembolism:  Goal: Will show no signs or symptoms of venous thromboembolism  Description  Will show no signs or symptoms of venous thromboembolism  2/26/2020 0516 by Catalina Bob RN  Outcome: Ongoing  2/25/2020 1820 by Lie Chase RN  Outcome: Met This Shift  2/25/2020 1819 by Lei Chase RN  Outcome: Met This Shift  Goal: Absence of signs or symptoms of impaired coagulation  Description  Absence of signs or symptoms of impaired coagulation  2/26/2020 0516 by Catalina Bob RN  Outcome: Ongoing  2/25/2020 1820 by Lei Chase RN  Outcome: Met This Shift

## 2020-02-26 NOTE — CARE COORDINATION
SW met with patient/MOB in room to discuss discharge planning and home health visit. Patient/MOB states that she has all items needed for infant care including, car seat, crib, diapers, and bottles. Infant will be seen at [facility] for pediatric care, Patient/MOB understands appointment is needed before discharge. SW discussed home health visit and offered options, patient declines. At this time patient does not indicate any other needs. SW available if discharge needs arise.   Electronically signed by Sudhir Flores on 2/26/2020 at 9:11 AM

## 2020-02-27 VITALS
BODY MASS INDEX: 28.17 KG/M2 | HEART RATE: 82 BPM | SYSTOLIC BLOOD PRESSURE: 116 MMHG | OXYGEN SATURATION: 98 % | HEIGHT: 64 IN | TEMPERATURE: 97.8 F | WEIGHT: 165 LBS | DIASTOLIC BLOOD PRESSURE: 79 MMHG | RESPIRATION RATE: 16 BRPM

## 2020-02-27 PROCEDURE — 6370000000 HC RX 637 (ALT 250 FOR IP): Performed by: OBSTETRICS & GYNECOLOGY

## 2020-02-27 RX ORDER — OXYCODONE HYDROCHLORIDE AND ACETAMINOPHEN 5; 325 MG/1; MG/1
1 TABLET ORAL EVERY 6 HOURS PRN
Qty: 28 TABLET | Refills: 0 | Status: SHIPPED | OUTPATIENT
Start: 2020-02-27 | End: 2020-03-05

## 2020-02-27 RX ORDER — IBUPROFEN 800 MG/1
800 TABLET ORAL EVERY 8 HOURS PRN
Qty: 60 TABLET | Refills: 1 | Status: SHIPPED | OUTPATIENT
Start: 2020-02-27

## 2020-02-27 RX ORDER — PSEUDOEPHEDRINE HCL 30 MG
100 TABLET ORAL 2 TIMES DAILY
Qty: 60 CAPSULE | Refills: 1 | Status: SHIPPED | OUTPATIENT
Start: 2020-02-27

## 2020-02-27 RX ADMIN — OXYCODONE HYDROCHLORIDE AND ACETAMINOPHEN 2 TABLET: 5; 325 TABLET ORAL at 08:48

## 2020-02-27 RX ADMIN — PRENATAL VIT W/ FE FUMARATE-FA TAB 27-0.8 MG 1 TABLET: 27-0.8 TAB at 08:48

## 2020-02-27 RX ADMIN — IBUPROFEN 800 MG: 400 TABLET ORAL at 08:48

## 2020-02-27 RX ADMIN — DOCUSATE SODIUM 100 MG: 100 CAPSULE, LIQUID FILLED ORAL at 08:48

## 2020-02-27 ASSESSMENT — PAIN DESCRIPTION - FREQUENCY: FREQUENCY: INTERMITTENT

## 2020-02-27 ASSESSMENT — PAIN DESCRIPTION - PAIN TYPE: TYPE: ACUTE PAIN

## 2020-02-27 ASSESSMENT — PAIN SCALES - GENERAL
PAINLEVEL_OUTOF10: 7
PAINLEVEL_OUTOF10: 4

## 2020-02-27 ASSESSMENT — PAIN DESCRIPTION - DESCRIPTORS: DESCRIPTORS: CRAMPING;SORE

## 2020-02-27 ASSESSMENT — PAIN DESCRIPTION - LOCATION: LOCATION: ABDOMEN

## 2020-02-27 ASSESSMENT — PAIN DESCRIPTION - ONSET: ONSET: GRADUAL

## 2020-02-27 ASSESSMENT — PAIN DESCRIPTION - ORIENTATION: ORIENTATION: MID;LOWER

## 2020-02-27 ASSESSMENT — PAIN DESCRIPTION - PROGRESSION: CLINICAL_PROGRESSION: GRADUALLY WORSENING

## 2020-02-27 NOTE — DISCHARGE SUMMARY
Department of Obstetrics and Gynecology  Postpartum Discharge Summary      Admit Date: 2020    Admit Diagnosis: History of  [Z98.891]    Discharge Date: 2020    Discharge Diagnoses: repeat C section     Francisca Asper   Home Medication Instructions [id number]    Printed on:20 1219   Medication Information                      docusate sodium (COLACE, DULCOLAX) 100 MG CAPS  Take 100 mg by mouth 2 times daily             ibuprofen (ADVIL;MOTRIN) 800 MG tablet  Take 1 tablet by mouth every 8 hours as needed for Pain             oxyCODONE-acetaminophen (PERCOCET) 5-325 MG per tablet  Take 1 tablet by mouth every 6 hours as needed for Pain for up to 7 days. Prenatal Vit-Fe Fumarate-FA (PRENATAL PO)  Take 1 tablet by mouth daily                 Service: Obstetrics    Consults: none    Significant Diagnostic Studies: none    Postpartum complications: none     Condition at Discharge: good    Hospital Course: uncomplicated    Discharge Instructions: Activity: no lifting, Driving, or Strenuous exercise for 6 weeks, no sex for 6 weeks and as tolerated    Diet: regular diet    Instructions: No intercourse and nothing in the vagina for 6 weeks. Do not drive while using pain medications.  Keep any wounds clean and dry    Discharge to: Home    Disposition / Follow up: Return to office in 6 weeks    Home Health Nurse visit within 24-48 h if qualifies     Data:  Weight   Information for the patient's :  Linda Quinonez [[id number]]        [employer]   Information for the patient's :  Cristy Madden [address] with mother    Electronically signed by Shayy Caraballo MD on 2020 at 12:19 PM

## 2020-02-27 NOTE — FLOWSHEET NOTE

## 2020-02-27 NOTE — PLAN OF CARE
Completed  2/27/2020 0503 by Salvatore Gutierrez RN  Outcome: Met This Shift     Problem: Pain:  Description  Pain management should include both nonpharmacologic and pharmacologic interventions. Goal: Pain level will decrease  Description  Pain level will decrease  2/27/2020 1321 by Pradip Barrios RN  Outcome: Completed  2/27/2020 0503 by Salvatore Gutierrez RN  Outcome: Ongoing  Note:   Does have some pain and discomfort from her surgical incisional area. Pain meds given as ordered and relief was given. Goal: Control of acute pain  Description  Control of acute pain  Outcome: Completed  Goal: Control of chronic pain  Description  Control of chronic pain  Outcome: Completed     Problem: Discharge Planning:  Goal: Discharged to appropriate level of care  Description  Discharged to appropriate level of care  2/27/2020 1321 by Pradip Barrios RN  Outcome: Completed  2/27/2020 0503 by Salvatore Gutierrez RN  Outcome: Ongoing     Problem: Fluid Volume - Imbalance:  Goal: Absence of postpartum hemorrhage signs and symptoms  Description  Absence of postpartum hemorrhage signs and symptoms  2/27/2020 1321 by Pradip Barrios RN  Outcome: Completed  2/27/2020 0503 by Salvatore Gutierrez RN  Outcome: Met This Shift  Note:   Bleeding has been mild with no clots or odor  Goal: Absence of imbalanced fluid volume signs and symptoms  Description  Absence of imbalanced fluid volume signs and symptoms  2/27/2020 1321 by Pradip Barrios RN  Outcome: Completed  2/27/2020 0503 by Salvatore Gutierrez RN  Outcome: Met This Shift  Note:   Tolerating a regular diet and drinking fluids well. Voiding without difficulty     Problem: Infection - Surgical Site:  Goal: Will show no infection signs and symptoms  Description  Will show no infection signs and symptoms  2/27/2020 1321 by Pradip Barrios RN  Outcome: Completed  2/27/2020 0503 by Salvatore Gutierrez RN  Outcome: Met This Shift  Note:   VAA. Incisional area clean, dry and intact.   No drainage or odor noted.     Problem: Mood - Altered:  Goal: Mood stable  Description  Mood stable  2/27/2020 1321 by Chris Zhang RN  Outcome: Completed  2/27/2020 0503 by Reuben Leventhal, RN  Outcome: Met This Shift  Note:   Pleasant and cooperative. Problem: Nausea/Vomiting:  Goal: Absence of nausea/vomiting  Description  Absence of nausea/vomiting  2/27/2020 1321 by Chris Zhang RN  Outcome: Completed  2/27/2020 0503 by Reuben Leventhal, RN  Outcome: Met This Shift     Problem: Pain - Acute:  Goal: Pain level will decrease  Description  Pain level will decrease  2/27/2020 1321 by Chris Zhang RN  Outcome: Completed  2/27/2020 0503 by Reuben Leventhal, RN  Outcome: Ongoing  Note:   Does have some pain and discomfort from her surgical incisional area. Pain meds given as ordered and relief was given. Problem: Urinary Retention:  Goal: Urinary elimination within specified parameters  Description  Urinary elimination within specified parameters  2/27/2020 1321 by Chris Zhang RN  Outcome: Completed  2/27/2020 0503 by Reuben Leventhal, RN  Outcome: Met This Shift  Note:   Voiding without difficulty. No lower extremity swelling noted.      Problem: Venous Thromboembolism:  Goal: Will show no signs or symptoms of venous thromboembolism  Description  Will show no signs or symptoms of venous thromboembolism  2/27/2020 1321 by Chris Zhang RN  Outcome: Completed  2/27/2020 0503 by Reuben Leventhal, RN  Outcome: Met This Shift  Goal: Absence of signs or symptoms of impaired coagulation  Description  Absence of signs or symptoms of impaired coagulation  2/27/2020 1321 by Chris Zhang RN  Outcome: Completed  2/27/2020 0503 by Reuben Leventhal, RN  Outcome: Met This Shift

## 2020-02-27 NOTE — FLOWSHEET NOTE
Nurse entered room and patient coming out of bathroom, Patient crying stating she is in pain. She stated \" she has not had pain medication since last night. \"  Her abdomen from her incision and cramping. Incision well approximated without redness or edema. Fundus firm midline at U/-1. This nurse medicated patient with  Percocet and  Motrin as ordered. Patient lying down in bed . Nurse placed ice pack over incision area. Nurse provided pal crackers for patient and encouraged patient to order breakfast.  Name and number updated on whiteboard and encouraged to call.

## 2020-02-27 NOTE — FLOWSHEET NOTE
Mom resting in bed. Independent with her care and caring for infant. Pain med given as ordered for relief of surgical pain.

## 2020-02-28 LAB
BLOOD BANK DISPENSE STATUS: NORMAL
BLOOD BANK DISPENSE STATUS: NORMAL
BLOOD BANK PRODUCT CODE: NORMAL
BLOOD BANK PRODUCT CODE: NORMAL
BPU ID: NORMAL
BPU ID: NORMAL
DESCRIPTION BLOOD BANK: NORMAL
DESCRIPTION BLOOD BANK: NORMAL

## 2024-01-26 ENCOUNTER — HOSPITAL ENCOUNTER (OUTPATIENT)
Age: 34
Discharge: HOME | End: 2024-01-26
Payer: COMMERCIAL

## 2024-01-26 ENCOUNTER — HOSPITAL ENCOUNTER (OUTPATIENT)
Dept: GENERAL RADIOLOGY | Age: 34
Discharge: HOME | End: 2024-01-26
Attending: SPECIALIST
Payer: COMMERCIAL

## 2024-01-26 ENCOUNTER — HOSPITAL ENCOUNTER (OUTPATIENT)
Dept: ULTRASOUND IMAGING | Age: 34
Discharge: HOME | End: 2024-01-26
Payer: COMMERCIAL

## 2024-01-26 DIAGNOSIS — R10.9 STOMACH ACHE: ICD-10-CM

## 2024-01-26 PROCEDURE — 72100 X-RAY EXAM L-S SPINE 2/3 VWS: CPT

## 2024-01-26 PROCEDURE — 76700 US EXAM ABDOM COMPLETE: CPT

## (undated) DEVICE — Device

## (undated) DEVICE — Device: Brand: PORTEX

## (undated) DEVICE — SUTURE MCRYL SZ 0 L36IN ABSRB VLT L48MM CTX 1/2 CIR Y398H

## (undated) DEVICE — SUTURE VCRL SZ 4-0 L27IN ABSRB UD L60MM KS STR REV CUT NDL J662H

## (undated) DEVICE — SPONGE LAP W18XL18IN WHT COT 4 PLY FLD STRUNG RADPQ DISP ST

## (undated) DEVICE — SUTURE COAT VCRL SZ 0 L36IN ABSRB VLT CTX L48MM TAPERPOINT J370H

## (undated) DEVICE — POOLE SUCTION INSTRUMENT,RIGID: Brand: ARGYLE

## (undated) DEVICE — SAFESECURE,SECUREMENT,FOLEY CATH,STERILE: Brand: MEDLINE

## (undated) DEVICE — CHLORAPREP 26ML ORANGE

## (undated) DEVICE — SUTURE ABSRB BRAID COAT UD CTX 3-0 36IN VCRL J980H

## (undated) DEVICE — 9165 UNIVERSAL PATIENT PLATE: Brand: 3M™

## (undated) DEVICE — BAG,SPONGE COUNTER,BLUE,50/BX,5BX/CS: Brand: MEDLINE

## (undated) DEVICE — GARMENT,MEDLINE,DVT,INT,CALF,MED, GEN2: Brand: MEDLINE

## (undated) DEVICE — CATHETER TRAY 16 FR 5 CC FOL ANTIREFLX SAMPLING PRT DOVER

## (undated) DEVICE — COVER LT HNDL BLU PLAS

## (undated) DEVICE — GLOVE,SURG,SENSICARE SLT,LF,PF,6.5: Brand: MEDLINE

## (undated) DEVICE — CANISTER, RIGID, 3000CC: Brand: MEDLINE INDUSTRIES, INC.

## (undated) DEVICE — SKIN AFFIX SURG ADHESIVE 72/CS 0.55ML: Brand: MEDLINE

## (undated) DEVICE — SOLUTION IV IRRIG POUR BRL 0.9% SODIUM CHL 2F7124